# Patient Record
Sex: FEMALE | Race: WHITE | NOT HISPANIC OR LATINO | Employment: OTHER | URBAN - METROPOLITAN AREA
[De-identification: names, ages, dates, MRNs, and addresses within clinical notes are randomized per-mention and may not be internally consistent; named-entity substitution may affect disease eponyms.]

---

## 2017-02-16 DIAGNOSIS — F41.1 ANXIETY STATE: ICD-10-CM

## 2017-02-16 DIAGNOSIS — E78.5 HYPERLIPIDEMIA LDL GOAL <130: Primary | ICD-10-CM

## 2017-02-16 DIAGNOSIS — I10 HYPERTENSION GOAL BP (BLOOD PRESSURE) < 140/90: ICD-10-CM

## 2017-02-16 RX ORDER — PRAVASTATIN SODIUM 80 MG/1
80 TABLET ORAL DAILY
Qty: 90 TABLET | Refills: 0 | Status: SHIPPED | OUTPATIENT
Start: 2017-02-16 | End: 2017-02-27

## 2017-02-16 RX ORDER — LISINOPRIL 20 MG/1
20 TABLET ORAL DAILY
Qty: 90 TABLET | Refills: 0 | Status: SHIPPED | OUTPATIENT
Start: 2017-02-16 | End: 2017-02-27

## 2017-02-16 NOTE — TELEPHONE ENCOUNTER
pravastatin    Last Written Prescription Date: 11/25/2016  Last Fill Quantity: 30, # refills: 11  Last Office Visit with [facility], [facility] or [facility] prescribing provider: 3/3/2016       Lab Results   Component Value Date    CHOL 312 03/02/2016     Lab Results   Component Value Date    HDL 37 03/02/2016     Lab Results   Component Value Date     03/02/2016     Lab Results   Component Value Date    TRIG 327 03/02/2016     Lab Results   Component Value Date    CHOLHDLRATIO 5.2 02/09/2015     lisinopril    Last Written Prescription Date: 11/25/2016  Last Fill Quantity: 90, # refills: 3  Last Office Visit with [facility] or [facility] prescribing provider: 3/3/2016       Potassium   Date Value Ref Range Status   03/02/2016 4.2 3.4 - 5.3 mmol/L Final     Creatinine   Date Value Ref Range Status   03/02/2016 0.87 0.52 - 1.04 mg/dL Final     BP Readings from Last 3 Encounters:   03/02/16 122/80   02/09/15 130/86   01/22/14 130/90     amitriptyline    Last Written Prescription Date: 11/25/2016  Last Fill Quantity: 90, # refills: 3    Last Office Visit with [facility] or [facility] prescribing provider:  [id number]   Future Office Visit:      Creatinine   Date Value Ref Range Status   03/02/2016 0.87 0.52 - 1.04 mg/dL Final

## 2017-02-27 ENCOUNTER — OFFICE VISIT (OUTPATIENT)
Dept: FAMILY MEDICINE | Facility: CLINIC | Age: 52
End: 2017-02-27
Payer: COMMERCIAL

## 2017-02-27 VITALS
SYSTOLIC BLOOD PRESSURE: 130 MMHG | HEART RATE: 80 BPM | TEMPERATURE: 97.7 F | BODY MASS INDEX: 29.66 KG/M2 | DIASTOLIC BLOOD PRESSURE: 86 MMHG | HEIGHT: 65 IN | RESPIRATION RATE: 18 BRPM | WEIGHT: 178 LBS

## 2017-02-27 DIAGNOSIS — I10 HYPERTENSION GOAL BP (BLOOD PRESSURE) < 140/90: ICD-10-CM

## 2017-02-27 DIAGNOSIS — Z23 NEED FOR PROPHYLACTIC VACCINATION AND INOCULATION AGAINST INFLUENZA: ICD-10-CM

## 2017-02-27 DIAGNOSIS — Z11.59 NEED FOR HEPATITIS C SCREENING TEST: ICD-10-CM

## 2017-02-27 DIAGNOSIS — Z12.11 SPECIAL SCREENING FOR MALIGNANT NEOPLASMS, COLON: ICD-10-CM

## 2017-02-27 DIAGNOSIS — F41.1 ANXIETY STATE: ICD-10-CM

## 2017-02-27 DIAGNOSIS — E78.5 HYPERLIPIDEMIA LDL GOAL <130: Primary | ICD-10-CM

## 2017-02-27 DIAGNOSIS — L91.8 SKIN TAG: ICD-10-CM

## 2017-02-27 LAB
ANION GAP SERPL CALCULATED.3IONS-SCNC: 7 MMOL/L (ref 3–14)
BUN SERPL-MCNC: 10 MG/DL (ref 7–30)
CALCIUM SERPL-MCNC: 8.9 MG/DL (ref 8.5–10.1)
CHLORIDE SERPL-SCNC: 105 MMOL/L (ref 94–109)
CHOLEST SERPL-MCNC: 242 MG/DL
CO2 SERPL-SCNC: 25 MMOL/L (ref 20–32)
CREAT SERPL-MCNC: 0.86 MG/DL (ref 0.52–1.04)
GFR SERPL CREATININE-BSD FRML MDRD: 69 ML/MIN/1.7M2
GLUCOSE SERPL-MCNC: 88 MG/DL (ref 70–99)
HCV AB SERPL QL IA: NORMAL
HDLC SERPL-MCNC: 39 MG/DL
LDLC SERPL CALC-MCNC: 144 MG/DL
NONHDLC SERPL-MCNC: 203 MG/DL
POTASSIUM SERPL-SCNC: 3.9 MMOL/L (ref 3.4–5.3)
SODIUM SERPL-SCNC: 137 MMOL/L (ref 133–144)
TRIGL SERPL-MCNC: 295 MG/DL

## 2017-02-27 PROCEDURE — 99214 OFFICE O/P EST MOD 30 MIN: CPT | Mod: 25 | Performed by: FAMILY MEDICINE

## 2017-02-27 PROCEDURE — 90686 IIV4 VACC NO PRSV 0.5 ML IM: CPT | Performed by: FAMILY MEDICINE

## 2017-02-27 PROCEDURE — 80048 BASIC METABOLIC PNL TOTAL CA: CPT | Performed by: FAMILY MEDICINE

## 2017-02-27 PROCEDURE — 36415 COLL VENOUS BLD VENIPUNCTURE: CPT | Performed by: FAMILY MEDICINE

## 2017-02-27 PROCEDURE — 11200 RMVL SKIN TAGS UP TO&INC 15: CPT | Performed by: FAMILY MEDICINE

## 2017-02-27 PROCEDURE — 80061 LIPID PANEL: CPT | Performed by: FAMILY MEDICINE

## 2017-02-27 PROCEDURE — 90471 IMMUNIZATION ADMIN: CPT | Performed by: FAMILY MEDICINE

## 2017-02-27 PROCEDURE — 86803 HEPATITIS C AB TEST: CPT | Performed by: FAMILY MEDICINE

## 2017-02-27 RX ORDER — PRAVASTATIN SODIUM 80 MG/1
40 TABLET ORAL DAILY
Qty: 90 TABLET | Refills: 3 | COMMUNITY
Start: 2017-02-27 | End: 2017-02-27

## 2017-02-27 RX ORDER — PRAVASTATIN SODIUM 80 MG/1
80 TABLET ORAL DAILY
Qty: 90 TABLET | Refills: 3 | Status: SHIPPED | OUTPATIENT
Start: 2017-02-27 | End: 2018-03-05

## 2017-02-27 RX ORDER — LISINOPRIL 20 MG/1
20 TABLET ORAL DAILY
Qty: 90 TABLET | Refills: 3 | Status: SHIPPED | OUTPATIENT
Start: 2017-02-27 | End: 2017-05-26

## 2017-02-27 NOTE — PATIENT INSTRUCTIONS
ASSESSMENT:   (E78.5) Hyperlipidemia LDL goal <130  (primary encounter diagnosis)  Comment: due for recheck  Plan: Lipid panel reflex to direct LDL, pravastatin         (PRAVACHOL) 80 MG tablet, DISCONTINUED:         pravastatin (PRAVACHOL) 80 MG tablet        You may need to increase the pravastatin to 80mg (a full pill) once daily.    (I10) Hypertension goal BP (blood pressure) < 140/90  Comment: doing well  Plan: lisinopril (PRINIVIL/ZESTRIL) 20 MG tablet,         Basic metabolic panel        No change in current treatment plan.   Monitor BP periodically.  This can be done at home, in clinic, at our pharmacy, at a store or by neighbor or relative with a blood pressure cuff.  You should be sitting and relaxed for several minutes when taking blood pressure.   Goal BP (most readings) should be less than 140/90    Normal blood pressure is 120/80.    If your blood pressure is consistently at or above the goal, some changes should be made with lifestyle or medication to keep blood pressure under good control.    High blood pressure over time can lead to eye and kidney damage and increase risk for heart attacks and strokes.   Let us know if readings are often high, so we can help get your blood pressure under good control.     (F41.1) Anxiety state  Comment: doing well  Plan: amitriptyline (ELAVIL) 25 MG tablet        REfills for a year.   Continue the amitriptyline for anxiety.  If continuing to do well and feeling symptoms are well controlled, you may continue the medication and recheck in a year. If you have concerns about possible side effects or problems with the medication or if it seems like it is not working well, recheck at any time. If you would like at some time to get off the medication, it is best to gradually reduce the dose, contact the clinic for help in doing this.     (Z23) Need for prophylactic vaccination and inoculation against influenza  Comment:   Plan: FLU VAC, SPLIT VIRUS IM > 3 YO  (QUADRIVALENT)         [87054], Vaccine Administration, Initial         [51518]          (L91.8) Skin tag  Comment: left neck  Plan: REMOVAL OF SKIN TAGS, FIRST 15         Skin tags # 1 are snipped off after using alcohol for prep and  sterile forceps and iris scissors. NO local anesthesia used.  [place]  Dressing applied.  These pathognomonic lesions are not sent for pathology.    (Z11.59) Need for hepatitis C screening test  Comment:   Plan: Hepatitis C antibody          (Z12.11) Special screening for malignant neoplasms, colon  Comment:   Plan: Fecal colorectal cancer screen (FIT)        Complete FIT colon cancer screening test and send in the mail.

## 2017-02-27 NOTE — MR AVS SNAPSHOT
After Visit Summary   2/27/2017    Gala Phan    MRN: [id number]           Patient Information     Date Of Birth          1965        Visit Information        Provider Department      2/27/2017 8:40 AM Adam Maher MD [facility]        Today's Diagnoses     Hyperlipidemia LDL goal <130    -  1    Hypertension goal BP (blood pressure) < 140/90        Anxiety state        Need for prophylactic vaccination and inoculation against influenza        Skin tag        Need for hepatitis C screening test        Special screening for malignant neoplasms, colon          Care Instructions    ASSESSMENT:   (E78.5) Hyperlipidemia LDL goal <130  (primary encounter diagnosis)  Comment: due for recheck  Plan: Lipid panel reflex to direct LDL, pravastatin         (PRAVACHOL) 80 MG tablet, DISCONTINUED:         pravastatin (PRAVACHOL) 80 MG tablet        You may need to increase the pravastatin to 80mg (a full pill) once daily.    (I10) Hypertension goal BP (blood pressure) < 140/90  Comment: doing well  Plan: lisinopril (PRINIVIL/ZESTRIL) 20 MG tablet,         Basic metabolic panel        No change in current treatment plan.   Monitor BP periodically.  This can be done at home, in clinic, at our pharmacy, at a store or by neighbor or relative with a blood pressure cuff.  You should be sitting and relaxed for several minutes when taking blood pressure.   Goal BP (most readings) should be less than 140/90    Normal blood pressure is 120/80.    If your blood pressure is consistently at or above the goal, some changes should be made with lifestyle or medication to keep blood pressure under good control.    High blood pressure over time can lead to eye and kidney damage and increase risk for heart attacks and strokes.   Let us know if readings are often high, so we can help get your blood pressure under good control.     (F41.1) Anxiety state  Comment: doing well  Plan: amitriptyline  (ELAVIL) 25 MG tablet        REfills for a year.   Continue the amitriptyline for anxiety.  If continuing to do well and feeling symptoms are well controlled, you may continue the medication and recheck in a year. If you have concerns about possible side effects or problems with the medication or if it seems like it is not working well, recheck at any time. If you would like at some time to get off the medication, it is best to gradually reduce the dose, contact the clinic for help in doing this.     (Z23) Need for prophylactic vaccination and inoculation against influenza  Comment:   Plan: FLU VAC, SPLIT VIRUS IM > 3 YO (QUADRIVALENT)         [97002], Vaccine Administration, Initial         [61797]          (L91.8) Skin tag  Comment: left neck  Plan: REMOVAL OF SKIN TAGS, FIRST 15         Skin tags # 1 are snipped off after using alcohol for prep and  sterile forceps and iris scissors. NO local anesthesia used.  [place]  Dressing applied.  These pathognomonic lesions are not sent for pathology.    (Z11.59) Need for hepatitis C screening test  Comment:   Plan: Hepatitis C antibody          (Z12.11) Special screening for malignant neoplasms, colon  Comment:   Plan: Fecal colorectal cancer screen (FIT)        Complete FIT colon cancer screening test and send in the mail.         Follow-ups after your visit        Future tests that were ordered for you today     Open Future Orders        Priority Expected Expires Ordered    Fecal colorectal cancer screen (FIT) Routine 3/20/2017 5/22/2017 2/27/2017            Who to contact     If you have questions or need follow up information about today's clinic visit or your schedule please contact [facility] directly at [phone].  Normal or non-critical lab and imaging results will be communicated to you by MyChart, letter or phone within 4 business days after the clinic has received the results. If you do not hear from us within 7  "days, please contact the clinic through [employer] or phone. If you have a critical or abnormal lab result, we will notify you by phone as soon as possible.  Submit refill requests through [employer] or call your pharmacy and they will forward the refill request to us. Please allow 3 business days for your refill to be completed.          Additional Information About Your Visit        [employer] Information     [employer] lets you send messages to your doctor, view your test results, renew your prescriptions, schedule appointments and more. To sign up, go to [email or web address] . Click on \"Log in\" on the left side of the screen, which will take you to the Welcome page. Then click on \"Sign up Now\" on the right side of the page.     You will be asked to enter the access code listed below, as well as some personal information. Please follow the directions to create your username and password.     Your access code is: [email or web address]  Expires: 2017  9:19 AM     Your access code will  in 90 days. If you need help or a new code, please call your [place] clinic or [phone].        Care EveryWhere ID     This is your Care EveryWhere ID. This could be used by other organizations to access your [facility] medical records  [id number]        Your Vitals Were     Pulse Temperature Respirations Height BMI (Body Mass Index)       80 97.7  F (36.5  C) (Tympanic) 18 5' 5.25\" (1.657 m) 29.39 kg/m2        Blood Pressure from Last 3 Encounters:   17 130/86   16 122/80   02/09/15 130/86    Weight from Last 3 Encounters:   17 178 lb (80.7 kg)   16 174 lb (78.9 kg)   02/09/15 172 lb (78 kg)              We Performed the Following     Basic metabolic panel     FLU VAC, SPLIT VIRUS IM > 3 YO (QUADRIVALENT) [87284]     Hepatitis C antibody     Lipid panel reflex to direct LDL     REMOVAL OF SKIN TAGS, FIRST 15     Vaccine Administration, Initial [47934]          Today's Medication Changes          These changes " are accurate as of: 2/27/17  9:19 AM.  If you have any questions, ask your nurse or doctor.               Start taking these medicines.        Dose/Directions    pravastatin 80 MG tablet   Commonly known as:  PRAVACHOL   Used for:  Hyperlipidemia LDL goal <130        Dose:  80 mg   Take 1 tablet (80 mg) by mouth daily   Quantity:  90 tablet   Refills:  3         These medicines have changed or have updated prescriptions.        Dose/Directions    amitriptyline 25 MG tablet   Commonly known as:  ELAVIL   This may have changed:  additional instructions   Used for:  Anxiety state        Dose:  25 mg   Take 1 tablet (25 mg) by mouth At Bedtime Take one tablet by mouth every day for preventing anxiety   Quantity:  90 tablet   Refills:  3       lisinopril 20 MG tablet   Commonly known as:  PRINIVIL/ZESTRIL   This may have changed:  additional instructions   Used for:  Hypertension goal BP (blood pressure) < 140/90        Dose:  20 mg   Take 1 tablet (20 mg) by mouth daily For blood pressure.   Quantity:  90 tablet   Refills:  3            Where to get your medicines      These medications were sent to [facility] [address] [place] [address] AT [facility] & [address] [place] [id number]     Phone:  [phone]     amitriptyline 25 MG tablet    lisinopril 20 MG tablet    pravastatin 80 MG tablet                Primary Care Provider Office Phone # Fax #    Adam Maher [phone]       [facility] [address] [facility] [address]        Thank you!     Thank you for choosing [facility]  for your care. Our goal is always to provide you with excellent care. Hearing back from our patients is one way we can continue to improve our services. Please take a few minutes to complete the written survey that you may receive in the mail after your visit with us. Thank you!             Your Updated Medication List - Protect  others around you: Learn how to safely use, store and throw away your medicines at www.disposemymeds.org.          This list is accurate as of: 2/27/17  9:19 AM.  Always use your most recent med list.                   Brand Name Dispense Instructions for use    amitriptyline 25 MG tablet    ELAVIL    90 tablet    Take 1 tablet (25 mg) by mouth At Bedtime Take one tablet by mouth every day for preventing anxiety       fish oil-omega-3 fatty acids 1000 MG capsule      one cap every other day       lisinopril 20 MG tablet    PRINIVIL/ZESTRIL    90 tablet    Take 1 tablet (20 mg) by mouth daily For blood pressure.       pravastatin 80 MG tablet    PRAVACHOL    90 tablet    Take 1 tablet (80 mg) by mouth daily       ranitidine 150 MG tablet    ZANTAC    60 tablet    Take 1 tablet (150 mg) by mouth 2 times daily

## 2017-02-27 NOTE — PROGRESS NOTES
SUBJECTIVE:                                                    Gala Phan is a 51 year old female who presents to clinic today for the following health issues:  Chief Complaint   Patient presents with     Hyperlipidemia     Hypertension     Flu Shot      Last clinic visit: 3/2/2016.  Check left side of neck growth has noted for years.  She would like to have it removed.   She has been feeling well.     Hyperlipidemia Follow-Up      Rate your low fat/cholesterol diet?: fair    Taking statin?  Yes, no muscle aches from statin    Other lipid medications/supplements?:  none   She is taking only 40mg pravastatin.  Never tried 80mg dose.   She had leg cramps on atorvastatin.     Hypertension Follow-up      Outpatient blood pressures are not being checked.    Low Salt Diet: low salt     Problem 4:.   Anxiety doing well.  The amitriptyline helps.      Amount of exercise or physical activity: not at this time, but walks dog and walking at work.  Walks dog a mile.     Problems taking medications regularly: No    Medication side effects: none    Patient Active Problem List   Diagnosis     Anxiety state     Esophageal reflux     Breast screening     HYPERLIPIDEMIA LDL GOAL <130     Hypertension goal BP (blood pressure) < 140/90     Tobacco abuse      History   Smoking Status     Current Every Day Smoker     Packs/day: 0.50     Years: 28.00   Smokeless Tobacco     Never Used     Comment: started at age 19.  2/12/2010:smoking 1/4-1/2 ppd.     Smokes 1/2 ppd.  Thinks about quitting.  Too much stress to quit.    Tried Chantix.  Did not help-gave her crazy feelings.   Could not do patches.  Allergic to tape.     ROS:General: No change in weight, sleep or appetite.  Normal energy.  No fever or chills  Resp: No coughing, wheezing or shortness of breath  CV: No chest pains or palpitations  GI: No nausea, vomiting,  heartburn, abdominal pain, diarrhea, constipation or change in bowel habits  : No urinary frequency or  "dysuria, bladder or kidney problems  Musculoskeletal: No significant muscle or joint pains  Psychiatric: POSITIVE for:, anxiety, doing well.     OBJECTIVE:Blood pressure 130/86, pulse 80, temperature 97.7  F (36.5  C), temperature source Tympanic, resp. rate 18, height 5' 5.25\" (1.657 m), weight 178 lb (80.7 kg), not currently breastfeeding. BMI=Body mass index is 29.39 kg/(m^2).  GENERAL APPEARANCE ADULT: Alert, no acute distress  NECK: No adenopathy,masses or thyromegaly  RESP: lungs clear to auscultation   CV: normal rate, regular rhythm, no murmur or gallop  ABDOMEN: soft, no organomegaly, masses or tenderness  MS: extremities normal, no peripheral edema  SKIN: one small skin tag 1mm at base left neck     ASSESSMENT:   (E78.5) Hyperlipidemia LDL goal <130  (primary encounter diagnosis)  Comment: due for recheck  Plan: Lipid panel reflex to direct LDL, pravastatin         (PRAVACHOL) 80 MG tablet, DISCONTINUED:         pravastatin (PRAVACHOL) 80 MG tablet        You may need to increase the pravastatin to 80mg (a full pill) once daily.    (I10) Hypertension goal BP (blood pressure) < 140/90  Comment: doing well  Plan: lisinopril (PRINIVIL/ZESTRIL) 20 MG tablet,         Basic metabolic panel        No change in current treatment plan.   Monitor BP periodically.  This can be done at home, in clinic, at our pharmacy, at a store or by neighbor or relative with a blood pressure cuff.  You should be sitting and relaxed for several minutes when taking blood pressure.   Goal BP (most readings) should be less than 140/90    Normal blood pressure is 120/80.    If your blood pressure is consistently at or above the goal, some changes should be made with lifestyle or medication to keep blood pressure under good control.    High blood pressure over time can lead to eye and kidney damage and increase risk for heart attacks and strokes.   Let us know if readings are often high, so we can help get your blood pressure under good " control.     (F41.1) Anxiety state  Comment: doing well  Plan: amitriptyline (ELAVIL) 25 MG tablet        REfills for a year.   Continue the amitriptyline for anxiety.  If continuing to do well and feeling symptoms are well controlled, you may continue the medication and recheck in a year. If you have concerns about possible side effects or problems with the medication or if it seems like it is not working well, recheck at any time. If you would like at some time to get off the medication, it is best to gradually reduce the dose, contact the clinic for help in doing this.     (Z23) Need for prophylactic vaccination and inoculation against influenza  Comment:   Plan: FLU VAC, SPLIT VIRUS IM > 3 YO (QUADRIVALENT)         [53036], Vaccine Administration, Initial         [47024]          (L91.8) Skin tag  Comment: left neck  Plan: REMOVAL OF SKIN TAGS, FIRST 15         Skin tags # 1 are snipped off after using alcohol for prep and  sterile forceps and iris scissors. NO local anesthesia used.  [place]  Dressing applied.  These pathognomonic lesions are not sent for pathology.    (Z11.59) Need for hepatitis C screening test  Comment:   Plan: Hepatitis C antibody          (Z12.11) Special screening for malignant neoplasms, colon  Comment:   Plan: Fecal colorectal cancer screen (FIT)        Complete FIT colon cancer screening test and send in the mail.        ============================================================  Injectable Influenza Immunization Documentation    1.  Is the person to be vaccinated sick today?  No    2. Does the person to be vaccinated have an allergy to eggs or to a component of the vaccine?  No    3. Has the person to be vaccinated today ever had a serious reaction to influenza vaccine in the past?  No    4. Has the person to be vaccinated ever had Guillain-[place] syndrome?  No     Form completed by

## 2017-02-28 NOTE — PROGRESS NOTES
Please call. Hepatitis C test is negative.    The blood chemistries (Basic metabolic panel) are all normal including electrolytes (salt balances in the blood), blood glucose and kidney tests.  Lipids are abnormal.   PLAN:  She has been on pravastatin.  I would suggest changing to a more effective cholesterol lowering medication like atorvastatin at 40mg once daily.  We can send prescription if she would like #30 with 11 refills.     JOHNATHAN DRUMMOND MD

## 2017-03-02 NOTE — PROGRESS NOTES
Please call.  She could increase the pravastatin to 80mg daily if she would like to try that first.    We could send prescription if she would prefer, #30 with 3 refills.   Check fasting lipids in 2-3 months on the higher dose.

## 2017-03-07 NOTE — PROGRESS NOTES
Called patient, she has already increased the pravastatin.  Patient will recheck lipids in 2-3 months  Rox MENCHACA MA

## 2017-12-29 ENCOUNTER — TELEPHONE (OUTPATIENT)
Dept: FAMILY MEDICINE | Facility: CLINIC | Age: 52
End: 2017-12-29

## 2018-02-28 DIAGNOSIS — F41.1 ANXIETY STATE: ICD-10-CM

## 2018-02-28 NOTE — TELEPHONE ENCOUNTER
"Requested Prescriptions   Pending Prescriptions Disp Refills     amitriptyline (ELAVIL) 25 MG tablet 90 tablet 3     Sig: Take 1 tablet (25 mg) by mouth At Bedtime Take one tablet by mouth every day for preventing anxiety    Tricyclic Antidepressants Protocol Failed    2/28/2018  9:56 AM       Failed - Recent (12 mo) or future (30 d) visit with authorizing provider's specialty     Patient had office visit in the last year or has a visit in the next 30 days with authorizing provider.  See \"Patient Info\" tab in inbasket, or \"Choose Columns\" in Meds & Orders section of the refill encounter.            Failed - No positive pregnancy test in past 12 months       Passed - Blood pressure under 140/90 in past 12 months    BP Readings from Last 3 Encounters:   02/27/17 130/86   03/02/16 122/80   02/09/15 130/86                Passed - Patient is age 18 or older       Passed - No active pregnancy on record        Last Written Prescription Date:  02/27/17  Last Fill Quantity: 90,  # refills: 3   Last office visit: 2/27/2017 with prescribing provider:  02/27/17   Future Office Visit:      "

## 2018-03-05 ENCOUNTER — OFFICE VISIT (OUTPATIENT)
Dept: FAMILY MEDICINE | Facility: CLINIC | Age: 53
End: 2018-03-05
Payer: COMMERCIAL

## 2018-03-05 ENCOUNTER — TELEPHONE (OUTPATIENT)
Dept: FAMILY MEDICINE | Facility: CLINIC | Age: 53
End: 2018-03-05

## 2018-03-05 VITALS
HEART RATE: 88 BPM | BODY MASS INDEX: 29.82 KG/M2 | TEMPERATURE: 98.1 F | DIASTOLIC BLOOD PRESSURE: 88 MMHG | RESPIRATION RATE: 16 BRPM | HEIGHT: 65 IN | WEIGHT: 179 LBS | SYSTOLIC BLOOD PRESSURE: 130 MMHG

## 2018-03-05 DIAGNOSIS — Z23 NEED FOR PROPHYLACTIC VACCINATION AND INOCULATION AGAINST INFLUENZA: ICD-10-CM

## 2018-03-05 DIAGNOSIS — E78.5 HYPERLIPIDEMIA LDL GOAL <130: ICD-10-CM

## 2018-03-05 DIAGNOSIS — K21.9 GASTROESOPHAGEAL REFLUX DISEASE WITHOUT ESOPHAGITIS: ICD-10-CM

## 2018-03-05 DIAGNOSIS — I10 HYPERTENSION GOAL BP (BLOOD PRESSURE) < 140/90: Primary | ICD-10-CM

## 2018-03-05 DIAGNOSIS — Z72.0 TOBACCO ABUSE: ICD-10-CM

## 2018-03-05 DIAGNOSIS — L91.8 SKIN TAG: ICD-10-CM

## 2018-03-05 DIAGNOSIS — Z12.11 COLON CANCER SCREENING: ICD-10-CM

## 2018-03-05 DIAGNOSIS — F41.1 ANXIETY STATE: ICD-10-CM

## 2018-03-05 LAB
ANION GAP SERPL CALCULATED.3IONS-SCNC: 6 MMOL/L (ref 3–14)
BUN SERPL-MCNC: 11 MG/DL (ref 7–30)
CALCIUM SERPL-MCNC: 8.8 MG/DL (ref 8.5–10.1)
CHLORIDE SERPL-SCNC: 106 MMOL/L (ref 94–109)
CHOLEST SERPL-MCNC: 204 MG/DL
CO2 SERPL-SCNC: 26 MMOL/L (ref 20–32)
CREAT SERPL-MCNC: 0.86 MG/DL (ref 0.52–1.04)
GFR SERPL CREATININE-BSD FRML MDRD: 69 ML/MIN/1.7M2
GLUCOSE SERPL-MCNC: 79 MG/DL (ref 70–99)
HDLC SERPL-MCNC: 38 MG/DL
LDLC SERPL CALC-MCNC: 116 MG/DL
NONHDLC SERPL-MCNC: 166 MG/DL
POTASSIUM SERPL-SCNC: 4 MMOL/L (ref 3.4–5.3)
SODIUM SERPL-SCNC: 138 MMOL/L (ref 133–144)
TRIGL SERPL-MCNC: 249 MG/DL

## 2018-03-05 PROCEDURE — 11200 RMVL SKIN TAGS UP TO&INC 15: CPT | Performed by: FAMILY MEDICINE

## 2018-03-05 PROCEDURE — 90471 IMMUNIZATION ADMIN: CPT | Performed by: FAMILY MEDICINE

## 2018-03-05 PROCEDURE — 99214 OFFICE O/P EST MOD 30 MIN: CPT | Mod: 25 | Performed by: FAMILY MEDICINE

## 2018-03-05 PROCEDURE — 90686 IIV4 VACC NO PRSV 0.5 ML IM: CPT | Performed by: FAMILY MEDICINE

## 2018-03-05 PROCEDURE — 36415 COLL VENOUS BLD VENIPUNCTURE: CPT | Performed by: FAMILY MEDICINE

## 2018-03-05 PROCEDURE — 80061 LIPID PANEL: CPT | Performed by: FAMILY MEDICINE

## 2018-03-05 PROCEDURE — 80048 BASIC METABOLIC PNL TOTAL CA: CPT | Performed by: FAMILY MEDICINE

## 2018-03-05 RX ORDER — LISINOPRIL 20 MG/1
20 TABLET ORAL DAILY
Qty: 90 TABLET | Refills: 3 | Status: SHIPPED | OUTPATIENT
Start: 2018-03-05 | End: 2019-03-04

## 2018-03-05 RX ORDER — PRAVASTATIN SODIUM 80 MG/1
80 TABLET ORAL DAILY
Qty: 90 TABLET | Refills: 3 | Status: SHIPPED | OUTPATIENT
Start: 2018-03-05 | End: 2019-03-26

## 2018-03-05 ASSESSMENT — ANXIETY QUESTIONNAIRES
3. WORRYING TOO MUCH ABOUT DIFFERENT THINGS: NOT AT ALL
1. FEELING NERVOUS, ANXIOUS, OR ON EDGE: NOT AT ALL
2. NOT BEING ABLE TO STOP OR CONTROL WORRYING: NOT AT ALL
IF YOU CHECKED OFF ANY PROBLEMS ON THIS QUESTIONNAIRE, HOW DIFFICULT HAVE THESE PROBLEMS MADE IT FOR YOU TO DO YOUR WORK, TAKE CARE OF THINGS AT HOME, OR GET ALONG WITH OTHER PEOPLE: NOT DIFFICULT AT ALL
7. FEELING AFRAID AS IF SOMETHING AWFUL MIGHT HAPPEN: NOT AT ALL
6. BECOMING EASILY ANNOYED OR IRRITABLE: NOT AT ALL
1. FEELING NERVOUS, ANXIOUS, OR ON EDGE: NOT AT ALL
2. NOT BEING ABLE TO STOP OR CONTROL WORRYING: NOT AT ALL
GAD7 TOTAL SCORE: 0
6. BECOMING EASILY ANNOYED OR IRRITABLE: NOT AT ALL
5. BEING SO RESTLESS THAT IT IS HARD TO SIT STILL: NOT AT ALL
5. BEING SO RESTLESS THAT IT IS HARD TO SIT STILL: NOT AT ALL
GAD7 TOTAL SCORE: 0
3. WORRYING TOO MUCH ABOUT DIFFERENT THINGS: NOT AT ALL
7. FEELING AFRAID AS IF SOMETHING AWFUL MIGHT HAPPEN: NOT AT ALL

## 2018-03-05 ASSESSMENT — PATIENT HEALTH QUESTIONNAIRE - PHQ9
5. POOR APPETITE OR OVEREATING: NOT AT ALL
5. POOR APPETITE OR OVEREATING: NOT AT ALL

## 2018-03-05 ASSESSMENT — PAIN SCALES - GENERAL: PAINLEVEL: NO PAIN (0)

## 2018-03-05 NOTE — NURSING NOTE
"Chief Complaint   Patient presents with     Hypertension     Lipids     Flu Shot       Initial /88 (BP Location: Right arm, Patient Position: Chair, Cuff Size: Adult Large)  Pulse 88  Temp 98.1  F (36.7  C) (Tympanic)  Resp 16  Ht 5' 5.25\" (1.657 m)  Wt 179 lb (81.2 kg)  LMP 11/04/2009  Breastfeeding? No  BMI 29.56 kg/m2 Estimated body mass index is 29.56 kg/(m^2) as calculated from the following:    Height as of this encounter: 5' 5.25\" (1.657 m).    Weight as of this encounter: 179 lb (81.2 kg).      Health Maintenance that is potentially due pending provider review:  Pap Smear and Colonoscopy/FIT    Pt will schedule pap appt.    Is there anyone who you would like to be able to receive your results? No  If yes have patient fill out KATINA  Selene Lucio CMA    "

## 2018-03-05 NOTE — MR AVS SNAPSHOT
After Visit Summary   3/5/2018    Gala Phan    MRN: [id number]           Patient Information     Date Of Birth          1965        Visit Information        Provider Department      3/5/2018 9:40 AM Adam Maher MD [facility]        Today's Diagnoses     Hypertension goal BP (blood pressure) < 140/90    -  1    Hyperlipidemia LDL goal <130        Anxiety state        Gastroesophageal reflux disease without esophagitis        Colon cancer screening        Tobacco abuse        Skin tag          Care Instructions    ASSESSMENT:   (I10) Hypertension goal BP (blood pressure) < 140/90  (primary encounter diagnosis)  Comment: doing OK  Plan: lisinopril (PRINIVIL/ZESTRIL) 20 MG tablet,         Basic metabolic panel        No change in current treatment plan. REfills.   Monitor BP periodically.  This can be done at home, in clinic, at our pharmacy, at a store or by neighbor or relative with a blood pressure cuff.  You should be sitting and relaxed for several minutes when taking blood pressure.   Goal BP (most readings) should be less than 140/90    Normal blood pressure is 120/80.    If your blood pressure is consistently at or above the goal, some changes should be made with lifestyle or medication to keep blood pressure under good control.    High blood pressure over time can lead to eye and kidney damage and increase risk for heart attacks and strokes.   Let us know if readings are often high, so we can help get your blood pressure under good control.      (E78.5) Hyperlipidemia LDL goal <130  Comment: due for recheck  Plan: pravastatin (PRAVACHOL) 80 MG tablet, Lipid         panel reflex to direct LDL Fasting        Fasting blood tests today.  Refills.     (F41.1) Anxiety state  Comment: doing well  Plan: amitriptyline (ELAVIL) 25 MG tablet        Refills.     (K21.9) Gastroesophageal reflux disease without esophagitis  Comment: infrequent ranitidine use.   Plan:  ranitidine (ZANTAC) 150 MG tablet        REfills    (Z12.11) Colon cancer screening  Comment:   Plan: Fecal colorectal cancer screen (FIT)        Complete FIT colon cancer screening test and send in the mail.      (Z72.0) Tobacco abuse  Comment: thinking about quitting  Plan: varenicline (CHANTIX STARTING MONTH PAK) 0.5 MG        X 11 & 1 MG X 42 tablet        Reviewed potential side effects of Chantix including nausea and GI symptoms.  Also potential for abnormal dreams, suicidal thoughts  and psychiatric symptoms.   Instructions in use given with gradually increasing dose over the first week, 0.5 mg daily for 3 days, then twice daily for 4 days, then increase to 1 mg pills and take twice daily.  Start medication 1 week before stopping smoking.  Expect to use for 12 weeks and if successful in quitting, to continue use for another 12 weeks.  If not off cigarettes in 12 weeks, stop the medication and consider other options for quitting.                Follow-ups after your visit        Future tests that were ordered for you today     Open Future Orders        Priority Expected Expires Ordered    Fecal colorectal cancer screen (FIT) Routine 3/26/2018 5/28/2018 3/5/2018            Who to contact     If you have questions or need follow up information about today's clinic visit or your schedule please contact [facility] directly at [phone].  Normal or non-critical lab and imaging results will be communicated to you by MyChart, letter or phone within 4 business days after the clinic has received the results. If you do not hear from us within 7 days, please contact the clinic through MyChart or phone. If you have a critical or abnormal lab result, we will notify you by phone as soon as possible.  Submit refill requests through [facility] or call your pharmacy and they will forward the refill request to us. Please allow 3 business days for your refill to be completed.          Additional Information  "About Your Visit        Care EveryWhere ID     This is your Care EveryWhere ID. This could be used by other organizations to access your [facility] medical records  [id number]        Your Vitals Were     Pulse Temperature Respirations Height Last Period Breastfeeding?    88 98.1  F (36.7  C) (Tympanic) 16 5' 5.25\" (1.657 m) 11/04/2009 No    BMI (Body Mass Index)                   29.56 kg/m2            Blood Pressure from Last 3 Encounters:   03/05/18 130/88   02/27/17 130/86   03/02/16 122/80    Weight from Last 3 Encounters:   03/05/18 179 lb (81.2 kg)   02/27/17 178 lb (80.7 kg)   03/02/16 174 lb (78.9 kg)              We Performed the Following     Basic metabolic panel     Lipid panel reflex to direct LDL Fasting          Today's Medication Changes          These changes are accurate as of 3/5/18 10:29 AM.  If you have any questions, ask your nurse or doctor.               Start taking these medicines.        Dose/Directions    varenicline 0.5 MG X 11 & 1 MG X 42 tablet   Commonly known as:  CHANTIX STARTING MONTH HUMBLE   Used for:  Tobacco abuse   Started by:  Adam Maher MD        Take 0.5 mg daily for 3 days, then twice daily for 4 days, then increase to 1 mg pills and take twice daily.  Start medication 1 week before stopping smoking.   Quantity:  53 tablet   Refills:  5            Where to get your medicines      These medications were sent to [facility] [address] [place] [address] AT [facility] [address] [address]     Phone:  [phone]     amitriptyline 25 MG tablet    lisinopril 20 MG tablet    pravastatin 80 MG tablet    ranitidine 150 MG tablet         Some of these will need a paper prescription and others can be bought over the counter.  Ask your nurse if you have questions.     Bring a paper prescription for each of these medications     varenicline 0.5 MG X 11 & 1 MG X 42 tablet                Primary Care Provider Office " Phone # Fax #    Adam Maher [phone] [address] [place] [id number]        Equal Access to Services     LUCITA CARDONA : Giovanni aad ku hadjoselinhaleigh Sindhujonh, warobinda adonayqsena, qaybta kaalmada zenaida, ellie pierson tellykayode fregoso larachelelizabeth alvarado. So [facility] [phone].    ATENCIÓN: Si habla español, tiene a loo disposición servicios gratuitos de asistencia lingüística. Llame al [phone].    We comply with applicable federal civil rights laws and [place] laws. We do not discriminate on the basis of race, color, national origin, age, disability, sex, sexual orientation, or gender identity.            Thank you!     Thank you for choosing [facility]  for your care. Our goal is always to provide you with excellent care. Hearing back from our patients is one way we can continue to improve our services. Please take a few minutes to complete the written survey that you may receive in the mail after your visit with us. Thank you!             Your Updated Medication List - Protect others around you: Learn how to safely use, store and throw away your medicines at www.disposemymeds.org.          This list is accurate as of 3/5/18 10:29 AM.  Always use your most recent med list.                   Brand Name Dispense Instructions for use Diagnosis    amitriptyline 25 MG tablet    ELAVIL    90 tablet    Take 1 tablet (25 mg) by mouth At Bedtime Take one tablet by mouth every day for preventing anxiety.    Anxiety state       fish oil-omega-3 fatty acids 1000 MG capsule      one cap every other day        lisinopril 20 MG tablet    PRINIVIL/ZESTRIL    90 tablet    Take 1 tablet (20 mg) by mouth daily For blood pressure.    Hypertension goal BP (blood pressure) < 140/90       pravastatin 80 MG tablet    PRAVACHOL    90 tablet    Take 1 tablet (80 mg) by mouth daily    Hyperlipidemia LDL goal <130       ranitidine 150 MG tablet    ZANTAC    30 tablet    Take 1 tablet (150 mg) by  mouth 2 times daily    Gastroesophageal reflux disease without esophagitis       varenicline 0.5 MG X 11 & 1 MG X 42 tablet    CHANTIX STARTING MONTH HUMBLE    53 tablet    Take 0.5 mg daily for 3 days, then twice daily for 4 days, then increase to 1 mg pills and take twice daily.  Start medication 1 week before stopping smoking.    Tobacco abuse

## 2018-03-05 NOTE — PROGRESS NOTES

## 2018-03-05 NOTE — PROGRESS NOTES
SUBJECTIVE:   Gala Phan is a 52 year old female who presents to clinic today for the following health issues:  Chief Complaint   Patient presents with     Hypertension     Lipids     Flu Shot      Last clinic visit: 2/27/2017.  Needs refills.     Hyperlipidemia Follow-Up      Rate your low fat/cholesterol diet?: not monitoring fat    Taking statin?  Yes, no muscle aches from statin    Other lipid medications/supplements?:  Fish oil/Omega 3, dose  without side effects  She has been on pravastatin at night.     Hypertension Follow-up      Outpatient blood pressures are not being checked.    Low Salt Diet: no added salt    Anxiety Follow-Up    Status since last visit: stable and controlled    Other associated symptoms:None    Complicating factors:   Significant life event: No   Current substance abuse: None  Depression symptoms: No  ALISHA-7 SCORE 12/1/2011 1/22/2014 3/5/2018   Total Score 1 1 -   Total Score - - 0     She feels her anxiety has been doing well.   PHQ9 score today= 4,     ALISHA-7    Amount of exercise or physical activity: None    Problems taking medications regularly: No    Medication side effects: none    Diet: regular (no restrictions)    Problem 4:   One skin tag inferior to left axilla she wanted removed today.  Catches on bra.     Patient Active Problem List   Diagnosis     Anxiety state     Esophageal reflux     Breast screening     HYPERLIPIDEMIA LDL GOAL <130     Hypertension goal BP (blood pressure) < 140/90     Tobacco abuse      History   Smoking Status     Current Every Day Smoker     Packs/day: 0.50     Years: 28.00     Types: Cigarettes   Smokeless Tobacco     Never Used     Comment: started at age 19.  2/12/2010:smoking 1/4-1/2 ppd.     Smoking 1/2-1 ppd.  Tried Chantix in the past.  Caused weird dreams.  Did not help her quit.  Allergic to tape, feels she cannot do patch.   No alcohol.     ROS:General: No change in weight, sleep or appetite.  Normal energy.  No fever or chills.   "Difficulty losing weight.    Exercise:walks dogs.   Resp: No coughing, wheezing or shortness of breath  CV: No chest pains or palpitations  GI: POSITIVE for:, heartburn or reflux, takes ranitidine infrequently.   : No urinary frequency or dysuria, bladder or kidney problems  Musculoskeletal: No significant muscle or joint pains  Psychiatric: No problems with anxiety, depression or mental health    OBJECTIVE:Blood pressure 130/88, pulse 88, temperature 98.1  F (36.7  C), temperature source Tympanic, resp. rate 16, height 5' 5.25\" (1.657 m), weight 179 lb (81.2 kg), last menstrual period 11/04/2009, not currently breastfeeding. BMI=Body mass index is 29.56 kg/(m^2).  GENERAL APPEARANCE ADULT: Alert, no acute distress, overweight  NECK: No adenopathy,masses or thyromegaly  RESP: lungs clear to auscultation   CV: normal rate, regular rhythm, no murmur or gallop  ABDOMEN: soft, no organomegaly, masses or tenderness  MS: extremities normal, no peripheral edema   SKIN: One flesh colored skin tag inferior to left axilla pedunculated, 4mm with 2mm base.    She has another skin tag on lower left eyelid just at or below margin.     ASSESSMENT:   (I10) Hypertension goal BP (blood pressure) < 140/90  (primary encounter diagnosis)  Comment: doing OK  Plan: lisinopril (PRINIVIL/ZESTRIL) 20 MG tablet,         Basic metabolic panel        No change in current treatment plan. REfills.   Monitor BP periodically.  This can be done at home, in clinic, at our pharmacy, at a store or by neighbor or relative with a blood pressure cuff.  You should be sitting and relaxed for several minutes when taking blood pressure.   Goal BP (most readings) should be less than 140/90    Normal blood pressure is 120/80.    If your blood pressure is consistently at or above the goal, some changes should be made with lifestyle or medication to keep blood pressure under good control.    High blood pressure over time can lead to eye and kidney damage and " increase risk for heart attacks and strokes.   Let us know if readings are often high, so we can help get your blood pressure under good control.      (E78.5) Hyperlipidemia LDL goal <130  Comment: due for recheck  Plan: pravastatin (PRAVACHOL) 80 MG tablet, Lipid         panel reflex to direct LDL Fasting        Fasting blood tests today.  Refills.     (F41.1) Anxiety state  Comment: doing well  Plan: amitriptyline (ELAVIL) 25 MG tablet        Refills.     (K21.9) Gastroesophageal reflux disease without esophagitis  Comment: infrequent ranitidine use.   Plan: ranitidine (ZANTAC) 150 MG tablet        REfills    (Z12.11) Colon cancer screening  Comment:   Plan: Fecal colorectal cancer screen (FIT)        Complete FIT colon cancer screening test and send in the mail.      (Z72.0) Tobacco abuse  Comment: thinking about quitting  Plan: varenicline (CHANTIX STARTING MONTH PAK) 0.5 MG        X 11 & 1 MG X 42 tablet        Reviewed potential side effects of Chantix including nausea and GI symptoms.  Also potential for abnormal dreams, suicidal thoughts  and psychiatric symptoms.   Instructions in use given with gradually increasing dose over the first week, 0.5 mg daily for 3 days, then twice daily for 4 days, then increase to 1 mg pills and take twice daily.  Start medication 1 week before stopping smoking.  Expect to use for 12 weeks and if successful in quitting, to continue use for another 12 weeks.  If not off cigarettes in 12 weeks, stop the medication and consider other options for quitting.     Skin tag   Skin tags # 1 are snipped off after using Betadine for prep and  sterile forceps and iris scissors. Local anesthesia with xylocaine with epinephrine was used. Electrocautery used on the base.  Dressing applied.  These pathognomonic lesions are not sent for pathology.    She will see Ophthalmology for removal of eyelid lesion. .

## 2018-03-05 NOTE — PATIENT INSTRUCTIONS
ASSESSMENT:   (I10) Hypertension goal BP (blood pressure) < 140/90  (primary encounter diagnosis)  Comment: doing OK  Plan: lisinopril (PRINIVIL/ZESTRIL) 20 MG tablet,         Basic metabolic panel        No change in current treatment plan. REfills.   Monitor BP periodically.  This can be done at home, in clinic, at our pharmacy, at a store or by neighbor or relative with a blood pressure cuff.  You should be sitting and relaxed for several minutes when taking blood pressure.   Goal BP (most readings) should be less than 140/90    Normal blood pressure is 120/80.    If your blood pressure is consistently at or above the goal, some changes should be made with lifestyle or medication to keep blood pressure under good control.    High blood pressure over time can lead to eye and kidney damage and increase risk for heart attacks and strokes.   Let us know if readings are often high, so we can help get your blood pressure under good control.      (E78.5) Hyperlipidemia LDL goal <130  Comment: due for recheck  Plan: pravastatin (PRAVACHOL) 80 MG tablet, Lipid         panel reflex to direct LDL Fasting        Fasting blood tests today.  Refills.     (F41.1) Anxiety state  Comment: doing well  Plan: amitriptyline (ELAVIL) 25 MG tablet        Refills.     (K21.9) Gastroesophageal reflux disease without esophagitis  Comment: infrequent ranitidine use.   Plan: ranitidine (ZANTAC) 150 MG tablet        REfills    (Z12.11) Colon cancer screening  Comment:   Plan: Fecal colorectal cancer screen (FIT)        Complete FIT colon cancer screening test and send in the mail.      (Z72.0) Tobacco abuse  Comment: thinking about quitting  Plan: varenicline (CHANTIX STARTING MONTH PAK) 0.5 MG        X 11 & 1 MG X 42 tablet        Reviewed potential side effects of Chantix including nausea and GI symptoms.  Also potential for abnormal dreams, suicidal thoughts  and psychiatric symptoms.   Instructions in use given with gradually increasing  dose over the first week, 0.5 mg daily for 3 days, then twice daily for 4 days, then increase to 1 mg pills and take twice daily.  Start medication 1 week before stopping smoking.  Expect to use for 12 weeks and if successful in quitting, to continue use for another 12 weeks.  If not off cigarettes in 12 weeks, stop the medication and consider other options for quitting.

## 2018-03-06 ASSESSMENT — ANXIETY QUESTIONNAIRES: GAD7 TOTAL SCORE: 0

## 2018-03-06 ASSESSMENT — PATIENT HEALTH QUESTIONNAIRE - PHQ9: SUM OF ALL RESPONSES TO PHQ QUESTIONS 1-9: 4

## 2018-03-06 NOTE — PROGRESS NOTES
Please call. The blood chemistries (Basic metabolic panel) are all normal including electrolytes (salt balances in the blood), blood glucose and kidney tests.   Lipids are abnormal but much better than when not on medication in the past.   PLAN: Weight loss, regular exercise with goal of 30 minutes most days of the week and eating a prudent diet (lots of fruits and vegetables, limiting unhealthy fats and excessive calories).  Continue the pravastatin at 80mg daily as she has been.

## 2018-04-18 PROCEDURE — 82274 ASSAY TEST FOR BLOOD FECAL: CPT | Performed by: FAMILY MEDICINE

## 2018-04-21 DIAGNOSIS — Z12.11 COLON CANCER SCREENING: ICD-10-CM

## 2018-04-21 LAB — HEMOCCULT STL QL IA: NEGATIVE

## 2018-04-21 NOTE — LETTER
April 24, 2018      Galavane Po[place]  [address] [facility] [address]        Dear ,    We are writing to inform you of your test results.    Your recent FIT test (colon cancer screening) was negative. Please repeat this screening yearly.    Resulted Orders   Fecal colorectal cancer screen (FIT)   Result Value Ref Range    Occult Blood Scn FIT Negative NEG^Negative       If you have any questions or concerns, please call the clinic at the number listed above.       Sincerely,    Adam Maher MD/ ss

## 2019-02-18 ENCOUNTER — TELEPHONE (OUTPATIENT)
Dept: FAMILY MEDICINE | Facility: CLINIC | Age: 54
End: 2019-02-18

## 2019-02-18 NOTE — TELEPHONE ENCOUNTER
Panel Management Review      Patient has the following on her problem list: None      Composite cancer screening  Chart review shows that this patient is due/due soon for the following Pap Smear, Mammogram, Colonoscopy and Fecal Colorectal (FIT)  Summary:    Patient is due/failing the following:   COLONOSCOPY, MAMMOGRAM and PAP    Action needed:   Patient needs office visit for Physical and cancer screening tests.    Type of outreach:    Sent letter.    Questions for provider review:    None                                                                                                                                    Mary Hartman MA

## 2019-03-04 DIAGNOSIS — I10 HYPERTENSION GOAL BP (BLOOD PRESSURE) < 140/90: ICD-10-CM

## 2019-03-04 RX ORDER — LISINOPRIL 20 MG/1
20 TABLET ORAL DAILY
Qty: 30 TABLET | Refills: 0 | Status: SHIPPED | OUTPATIENT
Start: 2019-03-04 | End: 2019-04-03

## 2019-03-04 NOTE — TELEPHONE ENCOUNTER
"Requested Prescriptions   Pending Prescriptions Disp Refills     lisinopril (PRINIVIL/ZESTRIL) 20 MG tablet 90 tablet 3     Sig: Take 1 tablet (20 mg) by mouth daily For blood pressure.    ACE Inhibitors (Including Combos) Protocol Passed - 3/4/2019 10:14 AM       Passed - Blood pressure under 140/90 in past 12 months    BP Readings from Last 3 Encounters:   03/05/18 130/88   02/27/17 130/86   03/02/16 122/80                Passed - Recent (12 mo) or future (30 days) visit within the authorizing provider's specialty    Patient had office visit in the last 12 months or has a visit in the next 30 days with authorizing provider or within the authorizing provider's specialty.  See \"Patient Info\" tab in inbasket, or \"Choose Columns\" in Meds & Orders section of the refill encounter.             Passed - Medication is active on med list       Passed - Patient is age 18 or older       Passed - No active pregnancy on record       Passed - Normal serum creatinine on file in past 12 months    Recent Labs   Lab Test 03/05/18  1034   CR 0.86            Passed - Normal serum potassium on file in past 12 months    Recent Labs   Lab Test 03/05/18  1034   POTASSIUM 4.0            Passed - No positive pregnancy test within past 12 months        Last Written Prescription Date:  03/15/18  Last Fill Quantity: 90,  # refills: 3   Last office visit: 3/5/2018 with prescribing provider:  03/05/18   Future Office Visit:      "

## 2019-03-25 DIAGNOSIS — F41.1 ANXIETY STATE: ICD-10-CM

## 2019-03-26 DIAGNOSIS — E78.5 HYPERLIPIDEMIA LDL GOAL <130: ICD-10-CM

## 2019-03-26 RX ORDER — PRAVASTATIN SODIUM 80 MG/1
80 TABLET ORAL DAILY
Qty: 30 TABLET | Refills: 0 | Status: SHIPPED | OUTPATIENT
Start: 2019-03-26 | End: 2019-04-03

## 2019-04-03 ENCOUNTER — OFFICE VISIT (OUTPATIENT)
Dept: FAMILY MEDICINE | Facility: CLINIC | Age: 54
End: 2019-04-03
Payer: COMMERCIAL

## 2019-04-03 VITALS
HEART RATE: 80 BPM | RESPIRATION RATE: 16 BRPM | WEIGHT: 181 LBS | TEMPERATURE: 97.3 F | DIASTOLIC BLOOD PRESSURE: 80 MMHG | SYSTOLIC BLOOD PRESSURE: 126 MMHG | BODY MASS INDEX: 30.16 KG/M2 | HEIGHT: 65 IN

## 2019-04-03 DIAGNOSIS — E78.5 HYPERLIPIDEMIA LDL GOAL <130: ICD-10-CM

## 2019-04-03 DIAGNOSIS — K21.9 GASTROESOPHAGEAL REFLUX DISEASE WITHOUT ESOPHAGITIS: ICD-10-CM

## 2019-04-03 DIAGNOSIS — F41.1 ANXIETY STATE: ICD-10-CM

## 2019-04-03 DIAGNOSIS — H92.03 OTALGIA OF BOTH EARS: ICD-10-CM

## 2019-04-03 DIAGNOSIS — Z12.11 COLON CANCER SCREENING: ICD-10-CM

## 2019-04-03 DIAGNOSIS — Z72.0 TOBACCO ABUSE: ICD-10-CM

## 2019-04-03 DIAGNOSIS — I10 HYPERTENSION GOAL BP (BLOOD PRESSURE) < 140/90: Primary | ICD-10-CM

## 2019-04-03 LAB
ANION GAP SERPL CALCULATED.3IONS-SCNC: 7 MMOL/L (ref 3–14)
BUN SERPL-MCNC: 13 MG/DL (ref 7–30)
CALCIUM SERPL-MCNC: 9.1 MG/DL (ref 8.5–10.1)
CHLORIDE SERPL-SCNC: 108 MMOL/L (ref 94–109)
CHOLEST SERPL-MCNC: 206 MG/DL
CO2 SERPL-SCNC: 25 MMOL/L (ref 20–32)
CREAT SERPL-MCNC: 0.88 MG/DL (ref 0.52–1.04)
GFR SERPL CREATININE-BSD FRML MDRD: 74 ML/MIN/{1.73_M2}
GLUCOSE SERPL-MCNC: 93 MG/DL (ref 70–99)
HDLC SERPL-MCNC: 38 MG/DL
LDLC SERPL CALC-MCNC: 117 MG/DL
NONHDLC SERPL-MCNC: 168 MG/DL
POTASSIUM SERPL-SCNC: 4.1 MMOL/L (ref 3.4–5.3)
SODIUM SERPL-SCNC: 140 MMOL/L (ref 133–144)
TRIGL SERPL-MCNC: 255 MG/DL

## 2019-04-03 PROCEDURE — 80061 LIPID PANEL: CPT | Performed by: FAMILY MEDICINE

## 2019-04-03 PROCEDURE — 80048 BASIC METABOLIC PNL TOTAL CA: CPT | Performed by: FAMILY MEDICINE

## 2019-04-03 PROCEDURE — 36415 COLL VENOUS BLD VENIPUNCTURE: CPT | Performed by: FAMILY MEDICINE

## 2019-04-03 PROCEDURE — 99214 OFFICE O/P EST MOD 30 MIN: CPT | Performed by: FAMILY MEDICINE

## 2019-04-03 RX ORDER — NEOMYCIN SULFATE, POLYMYXIN B SULFATE AND HYDROCORTISONE 10; 3.5; 1 MG/ML; MG/ML; [USP'U]/ML
3 SUSPENSION/ DROPS AURICULAR (OTIC) 4 TIMES DAILY
Qty: 1 BOTTLE | Refills: 3 | Status: SHIPPED | OUTPATIENT
Start: 2019-04-03 | End: 2021-06-04

## 2019-04-03 RX ORDER — PRAVASTATIN SODIUM 80 MG/1
80 TABLET ORAL DAILY
Qty: 90 TABLET | Refills: 3 | Status: SHIPPED | OUTPATIENT
Start: 2019-04-03 | End: 2020-03-25

## 2019-04-03 RX ORDER — LISINOPRIL 20 MG/1
20 TABLET ORAL DAILY
Qty: 90 TABLET | Refills: 3 | Status: SHIPPED | OUTPATIENT
Start: 2019-04-03 | End: 2020-03-25

## 2019-04-03 ASSESSMENT — MIFFLIN-ST. JEOR: SCORE: 1425.85

## 2019-04-03 NOTE — LETTER
"April 4, 2019      Gala Phan  [address] [facility] [address]        Dear ,    We are writing to inform you of your test results.    The blood chemistries (Basic metabolic panel) are all normal including electrolytes (salt balances in the blood), blood glucose and kidney tests.   Triglycerides, a type of fat found in the bloodstream is high.  LDL also high but she is on a good dose of pravastatin.  HDL (\"good cholesterol\") is low.  .  PLAN: No new changes in treatment recommended.   Weight loss, regular exercise with goal of 30 minutes most days of the week and eating a prudent diet (lots of fruits and vegetables, limiting unhealthy fats and excessive calories) can help improve cholesterol.    Resulted Orders   Lipid panel reflex to direct LDL Fasting   Result Value Ref Range    Cholesterol 206 (H) <200 mg/dL      Comment:      Desirable:       <200 mg/dl    Triglycerides 255 (H) <150 mg/dL      Comment:      Borderline high:  150-199 mg/dl  High:             200-499 mg/dl  Very high:       >499 mg/dl  Fasting specimen      HDL Cholesterol 38 (L) >49 mg/dL    LDL Cholesterol Calculated 117 (H) <100 mg/dL      Comment:      Above desirable:  100-129 mg/dl  Borderline High:  130-159 mg/dL  High:             160-189 mg/dL  Very high:       >189 mg/dl      Non HDL Cholesterol 168 (H) <130 mg/dL      Comment:      Above Desirable:  130-159 mg/dl  Borderline high:  160-189 mg/dl  High:             190-219 mg/dl  Very high:       >219 mg/dl     Basic metabolic panel   Result Value Ref Range    Sodium 140 133 - 144 mmol/L    Potassium 4.1 3.4 - 5.3 mmol/L    Chloride 108 94 - 109 mmol/L    Carbon Dioxide 25 20 - 32 mmol/L    Anion Gap 7 3 - 14 mmol/L    Glucose 93 70 - 99 mg/dL      Comment:      Fasting specimen    Urea Nitrogen 13 7 - 30 mg/dL    Creatinine 0.88 0.52 - 1.04 mg/dL    GFR Estimate 74 >60 mL/min/[1.73_m2]      Comment:      Non  GFR Calc  Starting 12/18/2018, " serum creatinine based estimated GFR (eGFR) will be   calculated using the Chronic Kidney Disease Epidemiology Collaboration   (CKD-EPI) equation.      GFR Estimate If Black 86 >60 mL/min/[1.73_m2]      Comment:       GFR Calc  Starting 12/18/2018, serum creatinine based estimated GFR (eGFR) will be   calculated using the Chronic Kidney Disease Epidemiology Collaboration   (CKD-EPI) equation.      Calcium 9.1 8.5 - 10.1 mg/dL       If you have any questions or concerns, please call the clinic at the number listed above.       Sincerely,        Adam Maher MD/[place]

## 2019-04-03 NOTE — NURSING NOTE
"Chief Complaint   Patient presents with     Hyperlipidemia     Hypertension       Initial /80   Pulse 80   Temp 97.3  F (36.3  C) (Tympanic)   Resp 16   Ht 1.657 m (5' 5.25\")   Wt 82.1 kg (181 lb)   LMP 11/04/2009   BMI 29.89 kg/m   Estimated body mass index is 29.89 kg/m  as calculated from the following:    Height as of this encounter: 1.657 m (5' 5.25\").    Weight as of this encounter: 82.1 kg (181 lb).    Patient presents to the clinic using No DME    Health Maintenance that is potentially due pending provider review:  Mammogram, Pap Smear and Colonoscopy/FIT    Discussed and FIT given to pt        Is there anyone who you would like to be able to receive your results?   If yes have patient fill out KATINA    "

## 2019-04-03 NOTE — PATIENT INSTRUCTIONS
ASSESSMENT:   (I10) Hypertension goal BP (blood pressure) < 140/90  (primary encounter diagnosis)  Comment: doing well  Plan: lisinopril (PRINIVIL/ZESTRIL) 20 MG tablet,         Basic metabolic panel        No change in current treatment plan.  Refills.   Monitor BP periodically.  This can be done at home, in clinic, at our pharmacy, at a store or by neighbor or relative with a blood pressure cuff.  You should be sitting and relaxed for several minutes when taking blood pressure.   Goal BP (most readings) should be less than 140/90    Normal blood pressure is 120/80.    If your blood pressure is consistently at or above the goal, some changes should be made with lifestyle or medication to keep blood pressure under good control.    High blood pressure over time can lead to eye and kidney damage and increase risk for heart attacks and strokes.   Let us know if readings are often high, so we can help get your blood pressure under good control.      (E78.5) Hyperlipidemia LDL goal <130  Comment: due for follow-up  Plan: Lipid panel reflex to direct LDL Fasting,         pravastatin (PRAVACHOL) 80 MG tablet        Fasting blood tests today.  Refills.     (K21.9) Gastroesophageal reflux disease without esophagitis  Comment:   Plan: ranitidine (ZANTAC) 150 MG tablet        REfills.  Uses as needed.     (F41.1) Anxiety state  Comment: stable  Plan: amitriptyline (ELAVIL) 25 MG tablet        Refills.     (Z12.11) Colon cancer screening  Comment:   Plan: Fecal colorectal cancer screen (FIT)        Complete FIT colon cancer screening test and send in the mail.      (Z72.0) Tobacco abuse  Comment: working on quitting.  Quitting smoking is the biggest factor in reducing chances of heart attacks.     (H92.03) Otalgia of both ears  Comment: I think you have sensitive skin in ears.   Plan: neomycin-polymyxin-hydrocortisone (CORTISPORIN)        3.5-71972-2 otic suspension          1% hydrocortisone cream twice daily as needed in  outer ear for itching or soreness.    If further in the ear canal, you can use the ear drops four times daily several drops at a time as needed.    Both these medications are anti-inflammatory.

## 2019-04-03 NOTE — PROGRESS NOTES
SUBJECTIVE:   Gala Phan is a 53 year old female who presents to clinic today for the following health issues:  Chief Complaint   Patient presents with     Hyperlipidemia     Hypertension      Last clinic visit: 3/5/2018.  Prescribed Chantix.     Left ear-some crusting and scaling.  Gets tender.    Needs refills.     Hyperlipidemia Follow-Up      Rate your low fat/cholesterol diet?: fair    Taking statin?  Yes, no muscle aches from statin    Other lipid medications/supplements?:  none    Hypertension Follow-up      Outpatient blood pressures are not being checked.    Low Salt Diet: low salt    Anxiety Follow-Up    Status since last visit: No change    Other associated symptoms:None    Complicating factors:   Significant life event: Mom and Dad has had some health issues   Current substance abuse: None  Depression symptoms: No  ALISHA-7 SCORE 1/22/2014 3/5/2018 3/5/2018   Total Score 1 - -   Total Score - 0 0   Mental health she feels is good.     ALISHA-7    Amount of exercise or physical activity: busy with her work    Problems taking medications regularly: No    Medication side effects: none      Bilateral ear issues      Duration: months to yeare    Description (location/character/radiation): notes swelling, sore and drainage    Intensity:  mild, moderate, severe    Accompanying signs and symptoms: see above    History (similar episodes/previous evaluation): Yes    Precipitating or alleviating factors: None    Therapies tried and outcome: peroxide on q-tip     History   Smoking Status     Current Every Day Smoker     Packs/day: 0.50     Years: 28.00     Types: Cigarettes   Smokeless Tobacco     Never Used     Comment: started at age 19.  2/12/2010:smoking 1/4-1/2 ppd.     Smoking 1/2 ppd.  Tried Chantix.  Caused side effects-nightmares.  Has tried nicotine gum.  Patches irritated skin.     Patient Active Problem List   Diagnosis     Anxiety state     Esophageal reflux     Breast screening     HYPERLIPIDEMIA  "LDL GOAL <130     Hypertension goal BP (blood pressure) < 140/90     Tobacco abuse      ROS:General: No change in weight, sleep or appetite.  Normal energy.  No fever or chills  Resp: No coughing, wheezing or shortness of breath  CV: No chest pains or palpitations  GI: POSITIVE for:, heartburn or reflux, takes ranitidine intermittently.   : No urinary frequency or dysuria, bladder or kidney problems  Musculoskeletal: No significant muscle or joint pains  Psychiatric: POSITIVE for:, anxiety, seems OK.  On amitriptyline nightly.     OBJECTIVE:Blood pressure 126/80, pulse 80, temperature 97.3  F (36.3  C), temperature source Tympanic, resp. rate 16, height 1.657 m (5' 5.25\"), weight 82.1 kg (181 lb), last menstrual period 11/04/2009, not currently breastfeeding. BMI=Body mass index is 29.89 kg/m .  GENERAL APPEARANCE ADULT: Alert, no acute distress, overweight  NECK: No adenopathy,masses or thyromegaly  RESP: lungs clear to auscultation   CV: normal rate, regular rhythm, no murmur or gallop  ABDOMEN: soft, no organomegaly, masses or tenderness  MS: extremities normal, no peripheral edema     ASSESSMENT:   (I10) Hypertension goal BP (blood pressure) < 140/90  (primary encounter diagnosis)  Comment: doing well  Plan: lisinopril (PRINIVIL/ZESTRIL) 20 MG tablet,         Basic metabolic panel        No change in current treatment plan.  Refills.   Monitor BP periodically.  This can be done at home, in clinic, at our pharmacy, at a store or by neighbor or relative with a blood pressure cuff.  You should be sitting and relaxed for several minutes when taking blood pressure.   Goal BP (most readings) should be less than 140/90    Normal blood pressure is 120/80.    If your blood pressure is consistently at or above the goal, some changes should be made with lifestyle or medication to keep blood pressure under good control.    High blood pressure over time can lead to eye and kidney damage and increase risk for heart attacks " and strokes.   Let us know if readings are often high, so we can help get your blood pressure under good control.      (E78.5) Hyperlipidemia LDL goal <130  Comment: due for follow-up  Plan: Lipid panel reflex to direct LDL Fasting,         pravastatin (PRAVACHOL) 80 MG tablet        Fasting blood tests today.  Refills.     (K21.9) Gastroesophageal reflux disease without esophagitis  Comment:   Plan: ranitidine (ZANTAC) 150 MG tablet        REfills.  Uses as needed.     (F41.1) Anxiety state  Comment: stable  Plan: amitriptyline (ELAVIL) 25 MG tablet        Refills.     (Z12.11) Colon cancer screening  Comment:   Plan: Fecal colorectal cancer screen (FIT)        Complete FIT colon cancer screening test and send in the mail.      (Z72.0) Tobacco abuse  Comment: working on quitting.  Quitting smoking is the biggest factor in reducing chances of heart attacks.     (H92.03) Otalgia of both ears  Comment: I think you have sensitive skin in ears.   Plan: neomycin-polymyxin-hydrocortisone (CORTISPORIN)        3.5-91034-2 otic suspension          1% hydrocortisone cream twice daily as needed in outer ear for itching or soreness.    If further in the ear canal, you can use the ear drops four times daily several drops at a time as needed.    Both these medications are anti-inflammatory.

## 2019-04-04 NOTE — RESULT ENCOUNTER NOTE
"Please call.  The blood chemistries (Basic metabolic panel) are all normal including electrolytes (salt balances in the blood), blood glucose and kidney tests.   Triglycerides, a type of fat found in the bloodstream is high.  LDL also high but she is on a good dose of pravastatin.  HDL (\"good cholesterol\") is low.  .  PLAN: No new changes in treatment recommended.   Weight loss, regular exercise with goal of 30 minutes most days of the week and eating a prudent diet (lots of fruits and vegetables, limiting unhealthy fats and excessive calories) can help improve cholesterol."

## 2019-04-09 ENCOUNTER — HOSPITAL ENCOUNTER (OUTPATIENT)
Dept: MAMMOGRAPHY | Facility: CLINIC | Age: 54
Discharge: HOME | End: 2019-04-09
Attending: FAMILY MEDICINE | Admitting: FAMILY MEDICINE
Payer: COMMERCIAL

## 2019-04-09 DIAGNOSIS — Z12.31 VISIT FOR SCREENING MAMMOGRAM: ICD-10-CM

## 2019-04-09 PROCEDURE — 77063 BREAST TOMOSYNTHESIS BI: CPT

## 2019-06-19 DIAGNOSIS — F41.1 ANXIETY STATE: ICD-10-CM

## 2019-06-19 NOTE — TELEPHONE ENCOUNTER
"Requested Prescriptions   Signed Prescriptions Disp Refills    amitriptyline (ELAVIL) 25 MG tablet 90 tablet 0     Sig: TAKE 1 TABLET BY MOUTH EVERY DAY AT BEDTIME FOR PREVENTING ANXIETY.       Tricyclic Agents ( Annual appt and no PHQ9) Passed - 6/19/2019  1:55 PM        Passed - Blood Pressure under 140/90 in past 12 mos     BP Readings from Last 3 Encounters:   04/03/19 126/80   03/05/18 130/88   02/27/17 130/86                 Passed - Recent (12 mo) or future (30 days) visit within authorizing provider's specialty     Patient had office visit in the last 12 months or has a visit in the next 30 days with authorizing provider or within the authorizing provider's specialty.  See \"Patient Info\" tab in inbasket, or \"Choose Columns\" in Meds & Orders section of the refill encounter.              Passed - Medication is active on med list        Passed - Patient is age 18 or older        Passed - Patient is not pregnant        Passed - No positive pregnancy test on record in past 12 mos          "

## 2019-07-10 ENCOUNTER — TELEPHONE (OUTPATIENT)
Dept: FAMILY MEDICINE | Facility: CLINIC | Age: 54
End: 2019-07-10

## 2019-07-10 NOTE — TELEPHONE ENCOUNTER
Panel Management Review      Patient has the following on her problem list:     Hypertension   Last three blood pressure readings:  BP Readings from Last 3 Encounters:   04/03/19 126/80   03/05/18 130/88   02/27/17 130/86     Blood pressure: Passed    HTN Guidelines:  Less than 140/90      Composite cancer screening  Chart review shows that this patient is due/due soon for the following Pap Smear and Fecal Colorectal (FIT)  Summary:    Patient is due/failing the following:   FIT and PAP    Action needed:   Patient needs office visit for pe and pap. Patient states she has the FIT test at home and will complete    Type of outreach:    Phone, spoke to patient.  .kf    Questions for provider review:    None                                                                                                                                    Selene Lucio CMA       Chart routed to  .

## 2020-03-25 ENCOUNTER — VIRTUAL VISIT (OUTPATIENT)
Dept: FAMILY MEDICINE | Facility: CLINIC | Age: 55
End: 2020-03-25
Payer: COMMERCIAL

## 2020-03-25 DIAGNOSIS — F41.1 ANXIETY STATE: ICD-10-CM

## 2020-03-25 DIAGNOSIS — I10 HYPERTENSION GOAL BP (BLOOD PRESSURE) < 140/90: Primary | ICD-10-CM

## 2020-03-25 DIAGNOSIS — Z72.0 TOBACCO ABUSE: ICD-10-CM

## 2020-03-25 DIAGNOSIS — E78.5 HYPERLIPIDEMIA LDL GOAL <130: ICD-10-CM

## 2020-03-25 DIAGNOSIS — K21.9 GASTROESOPHAGEAL REFLUX DISEASE WITHOUT ESOPHAGITIS: ICD-10-CM

## 2020-03-25 PROCEDURE — 99214 OFFICE O/P EST MOD 30 MIN: CPT | Mod: TEL | Performed by: FAMILY MEDICINE

## 2020-03-25 RX ORDER — LISINOPRIL 20 MG/1
20 TABLET ORAL DAILY
Qty: 90 TABLET | Refills: 3 | Status: SHIPPED | OUTPATIENT
Start: 2020-03-25 | End: 2021-03-26

## 2020-03-25 RX ORDER — PRAVASTATIN SODIUM 80 MG/1
80 TABLET ORAL DAILY
Qty: 90 TABLET | Refills: 3 | Status: SHIPPED | OUTPATIENT
Start: 2020-03-25 | End: 2021-03-26

## 2020-03-25 ASSESSMENT — ANXIETY QUESTIONNAIRES
7. FEELING AFRAID AS IF SOMETHING AWFUL MIGHT HAPPEN: NOT AT ALL
2. NOT BEING ABLE TO STOP OR CONTROL WORRYING: NOT AT ALL
GAD7 TOTAL SCORE: 0
3. WORRYING TOO MUCH ABOUT DIFFERENT THINGS: NOT AT ALL
5. BEING SO RESTLESS THAT IT IS HARD TO SIT STILL: NOT AT ALL
6. BECOMING EASILY ANNOYED OR IRRITABLE: NOT AT ALL
1. FEELING NERVOUS, ANXIOUS, OR ON EDGE: NOT AT ALL

## 2020-03-25 ASSESSMENT — PATIENT HEALTH QUESTIONNAIRE - PHQ9
SUM OF ALL RESPONSES TO PHQ QUESTIONS 1-9: 0
5. POOR APPETITE OR OVEREATING: NOT AT ALL

## 2020-03-25 NOTE — PROGRESS NOTES
"SUBJECTIVE: Gala Pahn is a 54 year old female  Chief Complaint   Patient presents with     Hypertension     Anxiety        Gala Phan is a 54 year old female who is being evaluated via a billable telephone visit.      The patient has been notified of following:     \"This telephone visit will be conducted via a call between you and your physician/provider. We have found that certain health care needs can be provided without the need for a physical exam.  This service lets us provide the care you need with a short phone conversation.  If a prescription is necessary we can send it directly to your pharmacy.  If lab work is needed we can place an order for that and you can then stop by our lab to have the test done at a later time.    If during the course of the call the physician/provider feels a telephone visit is not appropriate, you will not be charged for this service.\"     Gala Phan complains of    Chief Complaint   Patient presents with     Hypertension     Anxiety   Today I am doing a telephone visit due to COVID-19 virus outbreak and attempts to limit clinic visits   Faviola has a visit by telephone today primarily for refills on medication.  She reports feeling well.  Last clinic visit: 4/3/2019.   She has several chronic health issues.  #1 hypertension.  She reports not checking her blood pressure.  She takes lisinopril 20 mg daily.  No side effects.    #2 anxiety.  She has been taking amitriptyline 25 mg at bedtime.  She feels her anxiety has been doing well.   generalized anxiety disorder scale score today= 0.    She does not sleep well some difficulty both getting to sleep and waking up at night.  She has a work schedule Friday Saturday and Sunday from 4:30 AM to 4:30 PM.  Her usual bedtime is 10:00.  She tries to get to bed earlier on the nights that she is working.  She does not nap.    #3 hyperlipidemia.  She has been taking pravastatin 80 mg daily no side effects she had previous " side effects from statin drugs.    #4 GERD-she notes infrequent use of ranitidine maybe once every 3 months.      I have reviewed and updated the patient's Past Medical History, Social History, Family History and Medication List.    ALLERGIES  Atorvastatin; Penicillins; and Tape [adhesive tape]    ROS:   No changes in weight sleep appetite or energy.  She continues to smoke 1 pack/day.  She thinks about quitting often.  Tried Chantix in the past but had side effects nightmares.  She has not been on nicotine replacement or Wellbutrin.  We did discuss the option of taking those for helping quit smoking.  She uses occasional eardrops for external otitis.  She has been doing well recently.  She denies respiratory or cardiovascular symptoms.  No GI or  symptoms.  No musculoskeletal symptoms.    Assessment/Plan:  (I10) Hypertension goal BP (blood pressure) < 140/90  (primary encounter diagnosis)  Comment: Due for refills  Plan: lisinopril (ZESTRIL) 20 MG tablet, Basic         metabolic panel        Check blood pressure when possible.   REfills.     (F41.1) Anxiety state  Comment: doing well  Plan: amitriptyline (ELAVIL) 25 MG tablet        REfills.     (E78.5) Hyperlipidemia LDL goal <130  Comment: due for follow-up   Plan: pravastatin (PRAVACHOL) 80 MG tablet, Lipid         panel reflex to direct LDL Fasting        Refills.     (K21.9) Gastroesophageal reflux disease without esophagitis  Comment: doing well with minimal meds.   Plan: Taking ranitidine infrequently.     (Z72.0) Tobacco abuse  Comment: thinking about quitting.   Plan: I recommended quitting smoking.  Reviewed various medications available to help quit smoking including Chantix, Zyban or nicotine replacement products.  Offered assistance in stopping smoking when needed.     She has FIT at home.  Reminder to send in and check to see it has not .   Come in for labs  When able.   Shingles vaccine is recommended for those adults age 50 and older.  The  newer vaccine available since 2018 is very effective in preventing shingles (over 90%).  It is not currently covered by Medicare.  Check at pharmacy for this vaccine, they can check on your cost and give you the vaccine.   The vaccine may be less expensive at a pharmacy.         Phone call duration:  16 minutes    JOHNATHAN DRUMMOND MD

## 2020-03-25 NOTE — PATIENT INSTRUCTIONS
Assessment/Plan:  (I10) Hypertension goal BP (blood pressure) < 140/90  (primary encounter diagnosis)  Comment: Due for refills  Plan: lisinopril (ZESTRIL) 20 MG tablet, Basic         metabolic panel        Check blood pressure when possible.   REfills.     (F41.1) Anxiety state  Comment: doing well  Plan: amitriptyline (ELAVIL) 25 MG tablet        REfills.     (E78.5) Hyperlipidemia LDL goal <130  Comment: due for follow-up   Plan: pravastatin (PRAVACHOL) 80 MG tablet, Lipid         panel reflex to direct LDL Fasting        Refills.     (K21.9) Gastroesophageal reflux disease without esophagitis  Comment: doing well with minimal meds.   Plan: Taking ranitidine infrequently.     (Z72.0) Tobacco abuse  Comment: thinking about quitting.   Plan: I recommended quitting smoking.  Reviewed various medications available to help quit smoking including Chantix, Zyban or nicotine replacement products.  Offered assistance in stopping smoking when needed.     She has FIT at home.  Reminder to send in and check to see it has not .   Come in for labs  When able.   Shingles vaccine is recommended for those adults age 50 and older.  The newer vaccine available since 2018 is very effective in preventing shingles (over 90%).  It is not currently covered by Medicare.  Check at pharmacy for this vaccine, they can check on your cost and give you the vaccine.   The vaccine may be less expensive at a pharmacy.

## 2020-03-26 ASSESSMENT — ANXIETY QUESTIONNAIRES: GAD7 TOTAL SCORE: 0

## 2020-04-02 ENCOUNTER — OFFICE VISIT (OUTPATIENT)
Dept: FAMILY MEDICINE | Facility: CLINIC | Age: 55
End: 2020-04-02
Payer: COMMERCIAL

## 2020-04-02 VITALS
HEIGHT: 66 IN | SYSTOLIC BLOOD PRESSURE: 126 MMHG | WEIGHT: 184.6 LBS | DIASTOLIC BLOOD PRESSURE: 80 MMHG | RESPIRATION RATE: 18 BRPM | BODY MASS INDEX: 29.67 KG/M2 | HEART RATE: 97 BPM | OXYGEN SATURATION: 98 % | TEMPERATURE: 96.9 F

## 2020-04-02 DIAGNOSIS — H57.8A9 SENSATION OF FOREIGN BODY IN EYE: Primary | ICD-10-CM

## 2020-04-02 PROCEDURE — 99213 OFFICE O/P EST LOW 20 MIN: CPT | Performed by: PHYSICIAN ASSISTANT

## 2020-04-02 ASSESSMENT — MIFFLIN-ST. JEOR: SCORE: 1449.09

## 2020-04-02 NOTE — PROGRESS NOTES
Subjective     Gala Phan is a 54 year old female who presents to clinic today for the following health issues:    HPI   Eye(s) Problem      Duration: 2:00 this am today    Description:  Location: right  Pain: YES  Redness: YES  Discharge: YES- watery    Accompanying signs and symptoms: none    History (Trauma, foreign body exposure,): None    Precipitating or alleviating factors (contact use): None    Therapies tried and outcome: None    Patient Active Problem List   Diagnosis     Anxiety state     Esophageal reflux     Breast screening     HYPERLIPIDEMIA LDL GOAL <130     Hypertension goal BP (blood pressure) < 140/90     Tobacco abuse     Past Surgical History:   Procedure Laterality Date     SURGICAL HISTORY OF -       Breast biopsy - benign       Social History     Tobacco Use     Smoking status: Current Every Day Smoker     Packs/day: 0.50     Years: 28.00     Pack years: 14.00     Types: Cigarettes     Smokeless tobacco: Never Used     Tobacco comment: started at age 19.  2/12/2010:smoking 1/4-1/2 ppd.    Substance Use Topics     Alcohol use: No     Family History   Problem Relation Age of Onset     Lipids Mother      Eye Disorder Mother         degenerative disease     Hypertension Father      Cardiovascular Father         Blood clots, legs     Cardiovascular Maternal Grandmother      Lipids Sister      Lipids Sister      C.A.D. Brother         MI in mid 40s     Heart Disease Brother 40        MI     Breast Cancer Other         Pat cousin          Current Outpatient Medications   Medication Sig Dispense Refill     lisinopril (ZESTRIL) 20 MG tablet Take 1 tablet (20 mg) by mouth daily 90 tablet 3     neomycin-polymyxin-hydrocortisone (CORTISPORIN) 3.5-81291-9 otic suspension Place 3 drops into both ears 4 times daily 1 Bottle 3     pravastatin (PRAVACHOL) 80 MG tablet Take 1 tablet (80 mg) by mouth daily 90 tablet 3     amitriptyline (ELAVIL) 25 MG tablet TAKE 1 TABLET BY MOUTH EVERY DAY AT BEDTIME  "FOR PREVENTING ANXIETY. 90 tablet 3     ranitidine (ZANTAC) 150 MG tablet Take 1 tablet (150 mg) by mouth 2 times daily (Patient not taking: Reported on 4/2/2020) 180 tablet 3     Allergies   Allergen Reactions     Atorvastatin Cramps     Penicillins      Tape [Adhesive Tape]      Reviewed and updated as needed this visit by Provider  Tobacco  Allergies  Meds  Problems  Med Hx  Surg Hx  Fam Hx       Review of Systems   ROS COMP: Constitutional, HEENT systems are negative, except as otherwise noted.      Objective    /80   Pulse 97   Temp 96.9  F (36.1  C) (Tympanic)   Resp 18   Ht 1.676 m (5' 6\")   Wt 83.7 kg (184 lb 9.6 oz)   LMP 11/04/2009   SpO2 98%   BMI 29.80 kg/m    Body mass index is 29.8 kg/m .  Physical Exam   Constitutional: healthy, alert, and no distress  Head: Normocephalic. Atraumatic  Eyes: Conjunctival injection on the right. Foreign body sensation on the right.  Full inspection of the eye without evidence of foreign body.  The upper lid was everted as well without any evidence of foreign body.  PERRLA, EOMI.  Respiratory: No resp distress.  Musculoskeletal: extremities normal- no gross deformities noted, and normal muscle tone  Neurologic: Gait normal. CN 2-12 grossly intact  Psychiatric: mentation appears normal and affect normal/bright    Diagnostic Test Results:  none       Assessment & Plan   (H57.9) Sensation of foreign body in eye  (primary encounter diagnosis)  Comment: Extensive eye exam was unremarkable today. No vision changes. Fluorescein stain was without evidence of corneal abrasion or ulcer. Her conjunctival injection does not appear consistent with bacterial conjunctivitis today. No occupational exposures to be concerned for metallic foreign body. Considered glaucoma and other vision threatening conditions but she has no vision changes over the course of the day. Pt will follow up with [facility] in [place] tomorrow. Pt given phone number to schedule appt. If " unable to schedule appt, would recommend starting abx eye drops.     Plan: Follow-up with Ophtho. If unable to be seen tomorrow, will start abx eye drops.      Return in about 2 days (around 4/4/2020), or if symptoms worsen or fail to improve.    Shiv Haskins PA-C  [facility]

## 2020-07-26 ENCOUNTER — HOSPITAL ENCOUNTER (EMERGENCY)
Facility: CLINIC | Age: 55
Discharge: HOME | End: 2020-07-26
Attending: PHYSICIAN ASSISTANT | Admitting: PHYSICIAN ASSISTANT
Payer: COMMERCIAL

## 2020-07-26 ENCOUNTER — APPOINTMENT (OUTPATIENT)
Dept: GENERAL RADIOLOGY | Facility: CLINIC | Age: 55
End: 2020-07-26
Attending: PHYSICIAN ASSISTANT
Payer: COMMERCIAL

## 2020-07-26 VITALS
DIASTOLIC BLOOD PRESSURE: 90 MMHG | TEMPERATURE: 98.2 F | WEIGHT: 170 LBS | BODY MASS INDEX: 27.32 KG/M2 | HEIGHT: 66 IN | RESPIRATION RATE: 18 BRPM | OXYGEN SATURATION: 99 % | SYSTOLIC BLOOD PRESSURE: 159 MMHG

## 2020-07-26 DIAGNOSIS — S52.134A CLOSED NONDISPLACED FRACTURE OF NECK OF RIGHT RADIUS, INITIAL ENCOUNTER: ICD-10-CM

## 2020-07-26 PROCEDURE — 73060 X-RAY EXAM OF HUMERUS: CPT | Mod: RT

## 2020-07-26 PROCEDURE — 24650 CLTX RDL HEAD/NCK FX WO MNPJ: CPT | Mod: RT | Performed by: PHYSICIAN ASSISTANT

## 2020-07-26 PROCEDURE — 73070 X-RAY EXAM OF ELBOW: CPT | Mod: RT

## 2020-07-26 PROCEDURE — 99284 EMERGENCY DEPT VISIT MOD MDM: CPT | Mod: 25 | Performed by: PHYSICIAN ASSISTANT

## 2020-07-26 PROCEDURE — 73110 X-RAY EXAM OF WRIST: CPT | Mod: RT

## 2020-07-26 PROCEDURE — 99285 EMERGENCY DEPT VISIT HI MDM: CPT | Mod: 25 | Performed by: PHYSICIAN ASSISTANT

## 2020-07-26 PROCEDURE — 24650 CLTX RDL HEAD/NCK FX WO MNPJ: CPT | Mod: 54 | Performed by: PHYSICIAN ASSISTANT

## 2020-07-26 RX ORDER — HYDROCODONE BITARTRATE AND ACETAMINOPHEN 5; 325 MG/1; MG/1
1 TABLET ORAL EVERY 6 HOURS PRN
Qty: 8 TABLET | Refills: 0 | Status: SHIPPED | OUTPATIENT
Start: 2020-07-26 | End: 2020-07-29

## 2020-07-26 ASSESSMENT — MIFFLIN-ST. JEOR: SCORE: 1377.86

## 2020-07-26 ASSESSMENT — ENCOUNTER SYMPTOMS: CONSTITUTIONAL NEGATIVE: 1

## 2020-07-26 NOTE — ED PROVIDER NOTES
History     Chief Complaint   Patient presents with     Fall     pt fell on Thursday injuring right arm with pain     Arm Pain     HPI  Gala Phan is a 55 year old female who presents with complaints of right arm and elbow pain since she fell 3 days ago.  Patient states she was stepping on a chair in order to hang blinds when the chair slipped out from underneath her and she fell, landing on outstretched arms.  Patient denies any head trauma from the fall.  Patient reports persistent pain and swelling about her right elbow.  She also complains of inability to make a full fist with her right hand and feels more weak in this hand.  Patient is also unable to fully extend her right elbow.  Patient is right-hand dominant.  She also struck her left knee in the fall but denies any pain, swelling, or difficulties ambulating.      Allergies:  Allergies   Allergen Reactions     Atorvastatin Cramps     Penicillins      Tape [Adhesive Tape]        Problem List:    Patient Active Problem List    Diagnosis Date Noted     Tobacco abuse 02/09/2015     Priority: Medium     Hypertension goal BP (blood pressure) < 140/90 12/01/2011     Priority: Medium     HYPERLIPIDEMIA LDL GOAL <130 10/31/2010     Priority: Medium     Breast screening 03/18/2007     Priority: Medium     3/19/2007: Recommended close follow up breast exams-monthly self exams and mammogram in 6 months followed by surgery recheck. See path report.  Problem list name updated by automated process. Provider to review       Anxiety state 03/22/2006     Priority: Medium     12/1/2011:She has been taking amitriptyline for the anxiety with good results  Problem list name updated by automated process. Provider to review       Esophageal reflux 03/22/2006     Priority: Medium        Past Medical History:    No past medical history on file.    Past Surgical History:    Past Surgical History:   Procedure Laterality Date     SURGICAL HISTORY OF -       Breast biopsy -  "benign       Family History:    Family History   Problem Relation Age of Onset     Lipids Mother      Eye Disorder Mother         degenerative disease     Hypertension Father      Cardiovascular Father         Blood clots, legs     Cardiovascular Maternal Grandmother      Lipids Sister      Lipids Sister      C.A.D. Brother         MI in mid 40s     Heart Disease Brother 40        MI     Breast Cancer Other         Pat cousin        Social History:  Marital Status:   [2]  Social History     Tobacco Use     Smoking status: Current Every Day Smoker     Packs/day: 0.50     Years: 28.00     Pack years: 14.00     Types: Cigarettes     Smokeless tobacco: Never Used     Tobacco comment: started at age 19.  2/12/2010:smoking 1/4-1/2 ppd.    Substance Use Topics     Alcohol use: No     Drug use: No        Medications:    HYDROcodone-acetaminophen (NORCO) 5-325 MG tablet  amitriptyline (ELAVIL) 25 MG tablet  lisinopril (ZESTRIL) 20 MG tablet  neomycin-polymyxin-hydrocortisone (CORTISPORIN) 3.5-30949-0 otic suspension  pravastatin (PRAVACHOL) 80 MG tablet  ranitidine (ZANTAC) 150 MG tablet          Review of Systems   Constitutional: Negative.    Musculoskeletal:        Right elbow pain and swelling   Skin: Negative.    All other systems reviewed and are negative.      Physical Exam   BP: (!) 159/90  Heart Rate: 110  Temp: 98.2  F (36.8  C)  Resp: 18  Height: 167.6 cm (5' 6\")  Weight: 77.1 kg (170 lb)  SpO2: 99 %      Physical Exam  Constitutional:       General: She is not in acute distress.     Appearance: Normal appearance. She is not ill-appearing, toxic-appearing or diaphoretic.   HENT:      Head: Normocephalic and atraumatic.   Pulmonary:      Effort: Pulmonary effort is normal.   Musculoskeletal:      Right elbow: She exhibits decreased range of motion, swelling and effusion. She exhibits no deformity and no laceration. Tenderness found. Radial head tenderness noted. No medial epicondyle, no lateral epicondyle " and no olecranon process tenderness noted.      Right wrist: She exhibits normal range of motion, no tenderness and no swelling.      Left knee: She exhibits ecchymosis. She exhibits normal range of motion, no swelling and no effusion. No tenderness found.      Right upper arm: She exhibits tenderness, bony tenderness and swelling. She exhibits no deformity and no laceration.      Right forearm: She exhibits no tenderness, no bony tenderness and no swelling.      Right hand: She exhibits swelling. She exhibits normal range of motion, no tenderness, normal capillary refill, no deformity and no laceration. Normal sensation noted. Normal strength noted.      Comments: Swelling and tenderness about the right elbow.  Patient is unable to fully extend this elbow and has pain with supination.  There is associated bruising of the area.   Skin:     General: Skin is warm and dry.      Capillary Refill: Capillary refill takes less than 2 seconds.   Neurological:      Mental Status: She is alert.      Sensory: Sensation is intact.      Motor: Motor function is intact.         ED Course        Procedures    Results for orders placed or performed during the hospital encounter of 07/26/20 (from the past 24 hour(s))   Elbow XR, 2 views, right    Narrative    EXAM: XR HUMERUS RT G/E 2 VW, XR WRIST RT G/E 3 VW, XR ELBOW RT 2 VW  LOCATION: [facility]  DATE/TIME: 7/26/2020 2:57 PM    INDICATION: Fall on outstretched arm. Swelling and pain.  COMPARISON: None.      Impression    IMPRESSION:   1. Radial neck nondisplaced fracture with minimal impaction. No radiographically apparent articular surface involvement.  2. Normal-appearing humerus and wrist.   Humerus XR, G/E 2 views, right    Narrative    EXAM: XR HUMERUS RT G/E 2 VW, XR WRIST RT G/E 3 VW, XR ELBOW RT 2 VW  LOCATION: [facility]  DATE/TIME: 7/26/2020 2:57 PM    INDICATION: Fall on outstretched arm. Swelling and pain.  COMPARISON: None.      Impression     IMPRESSION:   1. Radial neck nondisplaced fracture with minimal impaction. No radiographically apparent articular surface involvement.  2. Normal-appearing humerus and wrist.   XR Wrist Right G/E 3 Views    Narrative    EXAM: XR HUMERUS RT G/E 2 VW, XR WRIST RT G/E 3 VW, XR ELBOW RT 2 VW  LOCATION: [facility]  DATE/TIME: 7/26/2020 2:57 PM    INDICATION: Fall on outstretched arm. Swelling and pain.  COMPARISON: None.      Impression    IMPRESSION:   1. Radial neck nondisplaced fracture with minimal impaction. No radiographically apparent articular surface involvement.  2. Normal-appearing humerus and wrist.       Medications - No data to display    Assessments & Plan (with Medical Decision Making)     Pt is a 55 year old female who presents with complaints of right arm and elbow pain since she fell 3 days ago.  Patient states she was stepping on a chair in order to hang blinds when the chair slipped out from underneath her and she fell, landing on outstretched arms.  Patient denies any head trauma from the fall.  Patient reports persistent pain and swelling about her right elbow.  She also complains of inability to make a full fist with her right hand and feels more weak in this hand.  Patient is also unable to fully extend her right elbow.  Patient is right-hand dominant.  She also struck her left knee in the fall but denies any pain, swelling, or difficulties ambulating.    Pt is afebrile on arrival.  Exam as above.  X-rays of the right elbow show a nondisplaced radial neck fracture with minimal impaction.  X-rays of the right humerus and wrist appear normal.  Discussed results with patient.  Patient was placed in a sling.  Encouraged symptomatic treatments at home.  Return precautions were reviewed.  Hand-outs were provided.    Patient was sent with [facility] for pain and was instructed to follow-up with orthopedics in 3 to 5 days for continued care and management (referral was placed).  She is to  return to the ED for persistent and/or worsening symptoms.  Patient expressed understanding of the diagnosis and plan and was discharged home in good condition.    I have reviewed the nursing notes.    I have reviewed the findings, diagnosis, plan and need for follow up with the patient.    Discharge Medication List as of 7/26/2020  4:00 PM      START taking these medications    Details   HYDROcodone-acetaminophen (NORCO) 5-325 MG tablet Take 1 tablet by mouth every 6 hours as needed for severe pain, Disp-8 tablet,R-0, Local Print             Final diagnoses:   Closed nondisplaced fracture of neck of right radius, initial encounter       7/26/2020   [facility] EMERGENCY DEPARTMENT      Disclaimer:  This note consists of symbols derived from keyboarding, dictation and/or voice recognition software.  As a result, there may be errors in the script that have gone undetected.  Please consider this when interpreting information found in this chart.     Izzy Dash PA-C  07/26/20 [id number]

## 2020-07-26 NOTE — ED AVS SNAPSHOT
[facility] Emergency Department  [address] [address]  Phone:  [phone]  Fax:  [phone]                                    Gala Phan   MRN: [id number]    Department:  [facility] Emergency Department   Date of Visit:  7/26/2020           After Visit Summary Signature Page    I have received my discharge instructions, and my questions have been answered. I have discussed any challenges I see with this plan with the nurse or doctor.    ..........................................................................................................................................  Patient/Patient Representative Signature      ..........................................................................................................................................  Patient Representative Print Name and Relationship to Patient    ..................................................               ................................................  Date                                   Time    ..........................................................................................................................................  Reviewed by Signature/Title    ...................................................              ..............................................  Date                                               Time          22EPIC Rev 08/18

## 2020-07-26 NOTE — ED NOTES
Right forearm pain after a fall a few days ago. Slight bruising noted but can move arm without difficulty

## 2020-07-29 ENCOUNTER — OFFICE VISIT (OUTPATIENT)
Dept: ORTHOPEDICS | Facility: CLINIC | Age: 55
End: 2020-07-29
Attending: PHYSICIAN ASSISTANT
Payer: COMMERCIAL

## 2020-07-29 VITALS — BODY MASS INDEX: 28.77 KG/M2 | HEIGHT: 66 IN | WEIGHT: 179 LBS

## 2020-07-29 DIAGNOSIS — S52.134A CLOSED NONDISPLACED FRACTURE OF NECK OF RIGHT RADIUS, INITIAL ENCOUNTER: Primary | ICD-10-CM

## 2020-07-29 PROCEDURE — 24650 CLTX RDL HEAD/NCK FX WO MNPJ: CPT | Mod: 55 | Performed by: ORTHOPAEDIC SURGERY

## 2020-07-29 PROCEDURE — 99203 OFFICE O/P NEW LOW 30 MIN: CPT | Mod: 57 | Performed by: ORTHOPAEDIC SURGERY

## 2020-07-29 ASSESSMENT — PAIN SCALES - GENERAL: PAINLEVEL: MODERATE PAIN (5)

## 2020-07-29 ASSESSMENT — MIFFLIN-ST. JEOR: SCORE: 1418.69

## 2020-07-29 NOTE — PROGRESS NOTES
Chief Complaint:   Chief Complaint   Patient presents with     Right Elbow - Pain     Right proximal radius (head/neck) fracture. DOI: 7/23/20. Fell off a chair while hanging a drape. Fell on both outstretched arms. She presents in a sling, takes ibuprofen and ices PRN. Right handed, denies prior right upper extremity injuries.         HPI: Gala Phan is a 55 year old female , right -hand dominant, who presents for evaluation and management of a right elbow injury. The injury occurred on 7/23/2020 while falling off a chair while hanging drapes. She had just finished the drapes and was putting up the lighting, started to fall and fell on all fours, mostly on the right upper extremity. Onset of pain from the palm to the shoulder. Was seen in the emergency room on 7/26/2020 with xrays of the right upper extremity showing a nondisplaced radial neck fracture. Placed into a sling. It has been 6 days since the initial injury. Overall doing better. Taking ibuprofen, ice for pain. Denies previous right upper extremity problems.    She's been on furlough x15 weeks and was scheduled to return to work last Monday, 7/27/2020. She works in a tool crib(shop).      She reports having mild - moderate pain/discomfort around the injury site. She denies numbness or tingling.   Pain severity: 5/10  Pain quality: dull, aching and shooting  Frequency of symptoms: frequently  Associated symptoms: radiating pain to the wrist  Aggravated by: activities  Relieved by: rest, not moving the elbow or using the arm.    Treatment up to this point:ice, NSAIDS and sling.  Prior history of related problems: no prior problems with this area in the past      Past medical history:  has no past medical history on file.   Patient Active Problem List   Diagnosis     Anxiety state     Esophageal reflux     Breast screening     HYPERLIPIDEMIA LDL GOAL <130     Hypertension goal BP (blood pressure) < 140/90     Tobacco abuse       Past surgical  history:  has a past surgical history that includes surgical history of - .     Medications:   Current Outpatient Medications:      amitriptyline (ELAVIL) 25 MG tablet, TAKE 1 TABLET BY MOUTH EVERY DAY AT BEDTIME FOR PREVENTING ANXIETY., Disp: 90 tablet, Rfl: 3     HYDROcodone-acetaminophen (NORCO) 5-325 MG tablet, Take 1 tablet by mouth every 6 hours as needed for severe pain, Disp: 8 tablet, Rfl: 0     lisinopril (ZESTRIL) 20 MG tablet, Take 1 tablet (20 mg) by mouth daily, Disp: 90 tablet, Rfl: 3     neomycin-polymyxin-hydrocortisone (CORTISPORIN) 3.5-34787-2 otic suspension, Place 3 drops into both ears 4 times daily, Disp: 1 Bottle, Rfl: 3     pravastatin (PRAVACHOL) 80 MG tablet, Take 1 tablet (80 mg) by mouth daily, Disp: 90 tablet, Rfl: 3     ranitidine (ZANTAC) 150 MG tablet, Take 1 tablet (150 mg) by mouth 2 times daily, Disp: 180 tablet, Rfl: 3     Allergies:     Allergies   Allergen Reactions     Atorvastatin Cramps     Penicillins      Tape [Adhesive Tape]         Family History: family history includes Breast Cancer in an other family member; C.A.D. in her brother; Cardiovascular in her father and maternal grandmother; Eye Disorder in her mother; Heart Disease (age of onset: 40) in her brother; Hypertension in her father; Lipids in her mother, sister, and sister.     Social History:  reports that she has been smoking cigarettes. She has a 14.00 pack-year smoking history. She has never used smokeless tobacco. She reports that she does not drink alcohol or use drugs.    Review of Systems:  ROS: 10 point ROS neg other than the symptoms noted above in the HPI and past medical history.    Physical Exam  GENERAL APPEARANCE: healthy, alert, no distress.   SKIN: no suspicious lesions or rashes  RESPIRATORY: No increased work of breathing.  NEURO: Normal strength and tone, mentation intact and speech normal  PSYCH:  mentation appears normal and affect normal. Not anxious.  Hands: no clubbing, nail pitting or  "nodes.    Ht 1.676 m (5' 6\")   Wt 81.2 kg (179 lb)   LMP 11/04/2009   BMI 28.89 kg/m       right  UPPER EXTREMITY:    Sensation intact to light touch in median, radial, ulnar and axillary nerve distributions  Palpable 2+ radial pulse, brisk capillary refill to all fingers, wwp  Intact epl fpl fdp edc wrist flexion/extension biceps triceps deltoid      The sling was removed  ELBOW:  Skin intact. No open wounds. No skin maceration.  Inspection: swelling, ecchymosis medial > lateral   Tender: lateral epicondyle, common extensor tendon and radial head/neck  Non-tender: medial epicondyle  Range of Motion: flexion: 130 degrees, extension: lacks 30 degrees, forearm rotation limited by discomfort.  Strength: grossly intact, pain limited.  Special tests: normal stability:   There is no gross deformity in the area.      X-rays:  3 views right wrist, 2 views right elbow, 2 views right humerus from 7/26/2020 were reviewed in clinic today. Slight impacted, nondisplaced fracture of the radial head/neck junction. Cystic lesion with sclerotic rim ulnar aspect of the lunate. Small calcific, well-corticated density off tip of acromion at the shoulder.    Assessment: 55 year old female , right -hand dominant, with right upper extremity injury, nondisplaced radial head/neck fracture.    Plan:.  * reviewed xrays with patient. There is a nondisplaced fracture of the radial neck at head/neck junction. These are common injuries and tend to be stable. These can be treated nonoperatively with a brief period of immobilization, then work on range of motion as comfort allows. The number one complication with this type of injury is elbow stiffness, notably extension, with prolonged immobilization or favoring elbow. That is the reason to get elbow moving as soon as comfort allows.  * d/c splint  * gentle elbow range of motion in all planes, flexion, extension, supination, pronation as comfort allows. Should increase daily.  * rest  * ice as " needed.  * pain control (tylenol, nsaids as needed). Patient declines need for prescription.  * activity modification  * no heavy lifting, pushing, pulling  * return to clinic 4 weeks, sooner if needed. 3 views elbow xrays.  * workability note provided today. No use of right upper extremity other than writing, keyboard. No lifting, pushing, pulling. Free range of left upper extremity.      Ant Rizo M.D., M.S.  Dept. of Orthopaedic Surgery  [facility]

## 2020-07-29 NOTE — LETTER
[facility]  [address]  [place] [id number] [phone]      WORKABILITY    [facility], . Ant Rizo M.D., Víctor Benitez PA-C  [facility] [place]        7/29/2020      RE: Gala Phan    [address] [address]        To whom it may concern:     Gala Phan is under my professional care for   1. Closed nondisplaced fracture of neck of right radius, initial encounter        Date of injury: 7/23/20.     Ms. Phan can return to work WITH RESTRICTIONS: non-weight bearing right upper extremity. No lifting, pulling, pushing, carrying, etc. Ok to type and mouse with right hand occasionally.     Next appointment: 4 weeks.      Víctor Benitez PA-C, CAQ (Ortho)  Supervising Physician: Ant Rizo M.D., M.S.  Dept. of Orthopaedic Surgery  [facility]

## 2020-07-29 NOTE — LETTER
7/29/2020         RE: Gala Phan  [address] [facility] [address]        Dear Colleague,    Thank you for referring your patient, Gala Phan, to the [facility]. Please see a copy of my visit note below.    Chief Complaint:   Chief Complaint   Patient presents with     Right Elbow - Pain     Right proximal radius (head/neck) fracture. DOI: 7/23/20. Fell off a chair while hanging a drape. Fell on both outstretched arms. She presents in a sling, takes ibuprofen and ices PRN. Right handed, denies prior right upper extremity injuries.         HPI: Gala Phan is a 55 year old female , right -hand dominant, who presents for evaluation and management of a right elbow injury. The injury occurred on 7/23/2020 while falling off a chair while hanging drapes. She had just finished the drapes and was putting up the lighting, started to fall and fell on all fours, mostly on the right upper extremity. Onset of pain from the palm to the shoulder. Was seen in the emergency room on 7/26/2020 with xrays of the right upper extremity showing a nondisplaced radial neck fracture. Placed into a sling. It has been 6 days since the initial injury. Overall doing better. Taking ibuprofen, ice for pain. Denies previous right upper extremity problems.    She's been on furlough x15 weeks and was scheduled to return to work last Monday, 7/27/2020. She works in a tool crib(shop).      She reports having mild - moderate pain/discomfort around the injury site. She denies numbness or tingling.   Pain severity: 5/10  Pain quality: dull, aching and shooting  Frequency of symptoms: frequently  Associated symptoms: radiating pain to the wrist  Aggravated by: activities  Relieved by: rest, not moving the elbow or using the arm.    Treatment up to this point:ice, NSAIDS and sling.  Prior history of related problems: no prior problems with this area in the past      Past medical history:   has no past medical history on file.   Patient Active Problem List   Diagnosis     Anxiety state     Esophageal reflux     Breast screening     HYPERLIPIDEMIA LDL GOAL <130     Hypertension goal BP (blood pressure) < 140/90     Tobacco abuse       Past surgical history:  has a past surgical history that includes surgical history of - .     Medications:   Current Outpatient Medications:      amitriptyline (ELAVIL) 25 MG tablet, TAKE 1 TABLET BY MOUTH EVERY DAY AT BEDTIME FOR PREVENTING ANXIETY., Disp: 90 tablet, Rfl: 3     HYDROcodone-acetaminophen (NORCO) 5-325 MG tablet, Take 1 tablet by mouth every 6 hours as needed for severe pain, Disp: 8 tablet, Rfl: 0     lisinopril (ZESTRIL) 20 MG tablet, Take 1 tablet (20 mg) by mouth daily, Disp: 90 tablet, Rfl: 3     neomycin-polymyxin-hydrocortisone (CORTISPORIN) 3.5-38260-9 otic suspension, Place 3 drops into both ears 4 times daily, Disp: 1 Bottle, Rfl: 3     pravastatin (PRAVACHOL) 80 MG tablet, Take 1 tablet (80 mg) by mouth daily, Disp: 90 tablet, Rfl: 3     ranitidine (ZANTAC) 150 MG tablet, Take 1 tablet (150 mg) by mouth 2 times daily, Disp: 180 tablet, Rfl: 3     Allergies:     Allergies   Allergen Reactions     Atorvastatin Cramps     Penicillins      Tape [Adhesive Tape]         Family History: family history includes Breast Cancer in an other family member; C.A.D. in her brother; Cardiovascular in her father and maternal grandmother; Eye Disorder in her mother; Heart Disease (age of onset: 40) in her brother; Hypertension in her father; Lipids in her mother, sister, and sister.     Social History:  reports that she has been smoking cigarettes. She has a 14.00 pack-year smoking history. She has never used smokeless tobacco. She reports that she does not drink alcohol or use drugs.    Review of Systems:  ROS: 10 point ROS neg other than the symptoms noted above in the HPI and past medical history.    Physical Exam  GENERAL APPEARANCE: healthy, alert, no  "distress.   SKIN: no suspicious lesions or rashes  RESPIRATORY: No increased work of breathing.  NEURO: Normal strength and tone, mentation intact and speech normal  PSYCH:  mentation appears normal and affect normal. Not anxious.  Hands: no clubbing, nail pitting or nodes.    Ht 1.676 m (5' 6\")   Wt 81.2 kg (179 lb)   LMP 11/04/2009   BMI 28.89 kg/m       right  UPPER EXTREMITY:    Sensation intact to light touch in median, radial, ulnar and axillary nerve distributions  Palpable 2+ radial pulse, brisk capillary refill to all fingers, wwp  Intact epl fpl fdp edc wrist flexion/extension biceps triceps deltoid      The sling was removed  ELBOW:  Skin intact. No open wounds. No skin maceration.  Inspection: swelling, ecchymosis medial > lateral   Tender: lateral epicondyle, common extensor tendon and radial head/neck  Non-tender: medial epicondyle  Range of Motion: flexion: 130 degrees, extension: lacks 30 degrees, forearm rotation limited by discomfort.  Strength: grossly intact, pain limited.  Special tests: normal stability:   There is no gross deformity in the area.      X-rays:  3 views right wrist, 2 views right elbow, 2 views right humerus from 7/26/2020 were reviewed in clinic today. Slight impacted, nondisplaced fracture of the radial head/neck junction. Cystic lesion with sclerotic rim ulnar aspect of the lunate. Small calcific, well-corticated density off tip of acromion at the shoulder.    Assessment: 55 year old female , right -hand dominant, with right upper extremity injury, nondisplaced radial head/neck fracture.    Plan:.  * reviewed xrays with patient. There is a nondisplaced fracture of the radial neck at head/neck junction. These are common injuries and tend to be stable. These can be treated nonoperatively with a brief period of immobilization, then work on range of motion as comfort allows. The number one complication with this type of injury is elbow stiffness, notably extension, with " prolonged immobilization or favoring elbow. That is the reason to get elbow moving as soon as comfort allows.  * d/c splint  * gentle elbow range of motion in all planes, flexion, extension, supination, pronation as comfort allows. Should increase daily.  * rest  * ice as needed.  * pain control (tylenol, nsaids as needed). Patient declines need for prescription.  * activity modification  * no heavy lifting, pushing, pulling  * return to clinic 4 weeks, sooner if needed. 3 views elbow xrays.  * workability note provided today. No use of right upper extremity other than writing, keyboard. No lifting, pushing, pulling. Free range of left upper extremity.      Ant Rizo M.D., M.S.  Dept. of Orthopaedic Surgery  [facility]      Again, thank you for allowing me to participate in the care of your patient.        Sincerely,        Ant Rizo MD

## 2020-08-31 ENCOUNTER — OFFICE VISIT (OUTPATIENT)
Dept: ORTHOPEDICS | Facility: CLINIC | Age: 55
End: 2020-08-31
Payer: COMMERCIAL

## 2020-08-31 ENCOUNTER — ANCILLARY PROCEDURE (OUTPATIENT)
Dept: GENERAL RADIOLOGY | Facility: CLINIC | Age: 55
End: 2020-08-31
Attending: ORTHOPAEDIC SURGERY
Payer: COMMERCIAL

## 2020-08-31 VITALS
HEIGHT: 66 IN | HEART RATE: 94 BPM | SYSTOLIC BLOOD PRESSURE: 141 MMHG | WEIGHT: 184.1 LBS | DIASTOLIC BLOOD PRESSURE: 90 MMHG | BODY MASS INDEX: 29.59 KG/M2

## 2020-08-31 DIAGNOSIS — S52.134A CLOSED NONDISPLACED FRACTURE OF NECK OF RIGHT RADIUS, INITIAL ENCOUNTER: ICD-10-CM

## 2020-08-31 DIAGNOSIS — S52.134A CLOSED NONDISPLACED FRACTURE OF NECK OF RIGHT RADIUS, INITIAL ENCOUNTER: Primary | ICD-10-CM

## 2020-08-31 PROCEDURE — 73080 X-RAY EXAM OF ELBOW: CPT | Mod: RT

## 2020-08-31 PROCEDURE — 99207 ZZC FRACTURE CARE IN GLOBAL PERIOD: CPT | Performed by: ORTHOPAEDIC SURGERY

## 2020-08-31 ASSESSMENT — PAIN SCALES - GENERAL: PAINLEVEL: NO PAIN (0)

## 2020-08-31 ASSESSMENT — MIFFLIN-ST. JEOR: SCORE: 1441.82

## 2020-08-31 NOTE — LETTER
"    8/31/2020         RE: Gala Phan  [address] [facility] [address]        Dear Colleague,    Thank you for referring your patient, Gala Phan, to the [facility]. Please see a copy of my visit note below.    Chief Complaint:   Chief Complaint   Patient presents with     Right Elbow - RECHECK     Radial head/neck fracture. DOI: 7/23/20, 6 wk s/p. Patient notes her elbow is doing ok. She does have a pop in her elbow. She notes it catches or something and she will have to twist it or move it around to get it to release. Denies any pain.      INJURY: nondisplaced right radial neck fracture  DATE of INJURY: 7/23/2020    HPI: Gala Phan is a 55 year old female , right -hand dominant, who presents for evaluation and management of a right elbow injury. Returns today for following of nondisplaced radial neck fracture. Overall pain improved, no pain. Has some \"catching\" in the elbow with rotation. Using the arm for light activities. She'd like to be able to use it a little at work now.    The injury occurred on 7/23/2020 while falling off a chair while hanging drapes. She had just finished the drapes and was putting up the lighting, started to fall and fell on all fours, mostly on the right upper extremity. Onset of pain from the palm to the shoulder. Was seen in the emergency room on 7/26/2020 with xrays of the right upper extremity showing a nondisplaced radial neck fracture. Placed into a sling. It has been almost 6 weeks since the initial injury. Denies previous right upper extremity problems.        She reports having no pain/discomfort around the injury site. She denies numbness or tingling.   Associated symptoms: radiating pain to the wrist, \"catching\" in the elbow.  Aggravated by: activities  Relieved by: rest, not moving the elbow or using the arm.    Treatment up to this point:ice, NSAIDS and sling.  Prior history of related problems: no prior " problems with this area in the past      Past medical history:  has no past medical history on file.   Patient Active Problem List   Diagnosis     Anxiety state     Esophageal reflux     Breast screening     HYPERLIPIDEMIA LDL GOAL <130     Hypertension goal BP (blood pressure) < 140/90     Tobacco abuse       Past surgical history:  has a past surgical history that includes surgical history of - .     Medications:   Current Outpatient Medications:      amitriptyline (ELAVIL) 25 MG tablet, TAKE 1 TABLET BY MOUTH EVERY DAY AT BEDTIME FOR PREVENTING ANXIETY., Disp: 90 tablet, Rfl: 3     lisinopril (ZESTRIL) 20 MG tablet, Take 1 tablet (20 mg) by mouth daily, Disp: 90 tablet, Rfl: 3     neomycin-polymyxin-hydrocortisone (CORTISPORIN) 3.5-69526-8 otic suspension, Place 3 drops into both ears 4 times daily, Disp: 1 Bottle, Rfl: 3     pravastatin (PRAVACHOL) 80 MG tablet, Take 1 tablet (80 mg) by mouth daily, Disp: 90 tablet, Rfl: 3     ranitidine (ZANTAC) 150 MG tablet, Take 1 tablet (150 mg) by mouth 2 times daily, Disp: 180 tablet, Rfl: 3     Allergies:     Allergies   Allergen Reactions     Atorvastatin Cramps     Penicillins      Tape [Adhesive Tape]      Latex Rash        Family History: family history includes Breast Cancer in an other family member; C.A.D. in her brother; Cardiovascular in her father and maternal grandmother; Eye Disorder in her mother; Heart Disease (age of onset: 40) in her brother; Hypertension in her father; Lipids in her mother, sister, and sister.     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  reports that she has been smoking cigarettes. She has a 14.00 pack-year smoking history. She has never used smokeless tobacco. She reports that she does not drink alcohol or use drugs.    Review of Systems:     Denies numbness, tingling, parasthesias.   Denies headaches.   Denies fevers, chills, night sweats   Denies chest pain.   Denies shortness of breath.   Denies any skin problems, abrasions, rashes,  "irritation.    Physical Exam  GENERAL APPEARANCE: healthy, alert, no distress.   SKIN: no suspicious lesions or rashes  RESPIRATORY: No increased work of breathing.  NEURO: Normal strength and tone, mentation intact and speech normal  PSYCH:  mentation appears normal and affect normal. Not anxious.    BP (!) 141/90   Pulse 94   Ht 1.676 m (5' 6\")   Wt 83.5 kg (184 lb 1.6 oz)   LMP 11/04/2009   BMI 29.71 kg/m       right  UPPER EXTREMITY:    Sensation intact to light touch in median, radial, ulnar and axillary nerve distributions  Palpable 2+ radial pulse, brisk capillary refill to all fingers, wwp  Intact epl fpl fdp edc wrist flexion/extension biceps triceps deltoid    ELBOW:  Skin intact. No open wounds. No skin maceration.  Inspection: no swelling. No ecchymosis.  Tender: lateral epicondyle, common extensor tendon and radial head/neck  Non-tender: medial epicondyle  Range of Motion: full flexion/extension, near full forearm rotation limited by discomfort at extreme supination.  Strength: grossly intact, pain limited.  Special tests: normal stability:   There is no gross deformity in the area.      X-rays: 3 views right elbow 8/31/2020 reviewed, showing stable alignment of nondisplaced radial neck fracture with interval callus, sclerosis. Stable alignment to previous.    3 views right wrist, 2 views right elbow, 2 views right humerus from 7/26/2020 : Slight impacted, nondisplaced fracture of the radial head/neck junction. Cystic lesion with sclerotic rim ulnar aspect of the lunate. Small calcific, well-corticated density off tip of acromion at the shoulder.    Assessment: 55 year old female , right -hand dominant, with right upper extremity injury, nondisplaced radial head/neck fracture.    Plan:.  * reviewed xrays with patient. The fracture alignment is stable. There is some healing, not complete.    * gentle elbow range of motion in all planes, flexion, extension, supination, pronation as comfort allows. " Should increase daily.  * rest  * ice as needed.  * pain control (tylenol, nsaids as needed). Patient declines need for prescription.  * activity modification  * no heavy lifting, pushing, pulling; 3lbs lifting restriction.  * return to clinic 4 weeks, sooner if needed. 3 views elbow xrays.  * workability note provided today. 3lbs lifting restriction right upper extremity, unlimited use left upper extremity.      Ant Rizo M.D., M.S.  Dept. of Orthopaedic Surgery  [facility]      Again, thank you for allowing me to participate in the care of your patient.        Sincerely,        Ant Rizo MD

## 2020-08-31 NOTE — LETTER
[facility]  [address] [address]  [place] [id number] [phone]      WORKABILITY    [facility], Dr. Ant Rizo M.D., CHAST[facility] LizKaci bah [place]        8/31/2020      RE: Gala Phan    [address] [facility] [address]        To whom it may concern:     Gala Phan is under my professional care for   1. Closed nondisplaced fracture of neck of right radius, initial encounter    .     Date of injury: 7/23/20.     Return to work date: 9/1/20   Ms. Phan can return to work WITH RESTRICTIONS: 3 lbs maximum lifting restriction with right upper extremity. Unlimited use with left upper extremity.   DURATION OF LIMITATIONS: 4 weeks      Next appointment: 1 month        Víctor Benitez PA-C, CAQ (Ortho)  Supervising Physician: Ant Rizo M.D., M.S.  Dept. of Orthopaedic Surgery  [facility]

## 2020-08-31 NOTE — PROGRESS NOTES
"Chief Complaint:   Chief Complaint   Patient presents with     Right Elbow - RECHECK     Radial head/neck fracture. DOI: 7/23/20, 6 wk s/p. Patient notes her elbow is doing ok. She does have a pop in her elbow. She notes it catches or something and she will have to twist it or move it around to get it to release. Denies any pain.      INJURY: nondisplaced right radial neck fracture  DATE of INJURY: 7/23/2020    HPI: Gala Phan is a 55 year old female , right -hand dominant, who presents for evaluation and management of a right elbow injury. Returns today for following of nondisplaced radial neck fracture. Overall pain improved, no pain. Has some \"catching\" in the elbow with rotation. Using the arm for light activities. She'd like to be able to use it a little at work now.    The injury occurred on 7/23/2020 while falling off a chair while hanging drapes. She had just finished the drapes and was putting up the lighting, started to fall and fell on all fours, mostly on the right upper extremity. Onset of pain from the palm to the shoulder. Was seen in the emergency room on 7/26/2020 with xrays of the right upper extremity showing a nondisplaced radial neck fracture. Placed into a sling. It has been almost 6 weeks since the initial injury. Denies previous right upper extremity problems.        She reports having no pain/discomfort around the injury site. She denies numbness or tingling.   Associated symptoms: radiating pain to the wrist, \"catching\" in the elbow.  Aggravated by: activities  Relieved by: rest, not moving the elbow or using the arm.    Treatment up to this point:ice, NSAIDS and sling.  Prior history of related problems: no prior problems with this area in the past      Past medical history:  has no past medical history on file.   Patient Active Problem List   Diagnosis     Anxiety state     Esophageal reflux     Breast screening     HYPERLIPIDEMIA LDL GOAL <130     Hypertension goal BP (blood " pressure) < 140/90     Tobacco abuse       Past surgical history:  has a past surgical history that includes surgical history of - .     Medications:   Current Outpatient Medications:      amitriptyline (ELAVIL) 25 MG tablet, TAKE 1 TABLET BY MOUTH EVERY DAY AT BEDTIME FOR PREVENTING ANXIETY., Disp: 90 tablet, Rfl: 3     lisinopril (ZESTRIL) 20 MG tablet, Take 1 tablet (20 mg) by mouth daily, Disp: 90 tablet, Rfl: 3     neomycin-polymyxin-hydrocortisone (CORTISPORIN) 3.5-54008-2 otic suspension, Place 3 drops into both ears 4 times daily, Disp: 1 Bottle, Rfl: 3     pravastatin (PRAVACHOL) 80 MG tablet, Take 1 tablet (80 mg) by mouth daily, Disp: 90 tablet, Rfl: 3     ranitidine (ZANTAC) 150 MG tablet, Take 1 tablet (150 mg) by mouth 2 times daily, Disp: 180 tablet, Rfl: 3     Allergies:     Allergies   Allergen Reactions     Atorvastatin Cramps     Penicillins      Tape [Adhesive Tape]      Latex Rash        Family History: family history includes Breast Cancer in an other family member; C.A.D. in her brother; Cardiovascular in her father and maternal grandmother; Eye Disorder in her mother; Heart Disease (age of onset: 40) in her brother; Hypertension in her father; Lipids in her mother, sister, and sister.     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  reports that she has been smoking cigarettes. She has a 14.00 pack-year smoking history. She has never used smokeless tobacco. She reports that she does not drink alcohol or use drugs.    Review of Systems:     Denies numbness, tingling, parasthesias.   Denies headaches.   Denies fevers, chills, night sweats   Denies chest pain.   Denies shortness of breath.   Denies any skin problems, abrasions, rashes, irritation.    Physical Exam  GENERAL APPEARANCE: healthy, alert, no distress.   SKIN: no suspicious lesions or rashes  RESPIRATORY: No increased work of breathing.  NEURO: Normal strength and tone, mentation intact and speech normal  PSYCH:  mentation appears normal  "and affect normal. Not anxious.    BP (!) 141/90   Pulse 94   Ht 1.676 m (5' 6\")   Wt 83.5 kg (184 lb 1.6 oz)   LMP 11/04/2009   BMI 29.71 kg/m       right  UPPER EXTREMITY:    Sensation intact to light touch in median, radial, ulnar and axillary nerve distributions  Palpable 2+ radial pulse, brisk capillary refill to all fingers, wwp  Intact epl fpl fdp edc wrist flexion/extension biceps triceps deltoid    ELBOW:  Skin intact. No open wounds. No skin maceration.  Inspection: no swelling. No ecchymosis.  Tender: lateral epicondyle, common extensor tendon and radial head/neck  Non-tender: medial epicondyle  Range of Motion: full flexion/extension, near full forearm rotation limited by discomfort at extreme supination.  Strength: grossly intact, pain limited.  Special tests: normal stability:   There is no gross deformity in the area.      X-rays: 3 views right elbow 8/31/2020 reviewed, showing stable alignment of nondisplaced radial neck fracture with interval callus, sclerosis. Stable alignment to previous.    3 views right wrist, 2 views right elbow, 2 views right humerus from 7/26/2020 : Slight impacted, nondisplaced fracture of the radial head/neck junction. Cystic lesion with sclerotic rim ulnar aspect of the lunate. Small calcific, well-corticated density off tip of acromion at the shoulder.    Assessment: 55 year old female , right -hand dominant, with right upper extremity injury, nondisplaced radial head/neck fracture.    Plan:.  * reviewed xrays with patient. The fracture alignment is stable. There is some healing, not complete.    * gentle elbow range of motion in all planes, flexion, extension, supination, pronation as comfort allows. Should increase daily.  * rest  * ice as needed.  * pain control (tylenol, nsaids as needed). Patient declines need for prescription.  * activity modification  * no heavy lifting, pushing, pulling; 3lbs lifting restriction.  * return to clinic 4 weeks, sooner if needed. " 3 views elbow xrays.  * workability note provided today. 3lbs lifting restriction right upper extremity, unlimited use left upper extremity.      Ant Rizo M.D., M.S.  Dept. of Orthopaedic Surgery  [facility]

## 2020-09-28 ENCOUNTER — OFFICE VISIT (OUTPATIENT)
Dept: ORTHOPEDICS | Facility: CLINIC | Age: 55
End: 2020-09-28
Payer: COMMERCIAL

## 2020-09-28 ENCOUNTER — ANCILLARY PROCEDURE (OUTPATIENT)
Dept: GENERAL RADIOLOGY | Facility: CLINIC | Age: 55
End: 2020-09-28
Attending: ORTHOPAEDIC SURGERY
Payer: COMMERCIAL

## 2020-09-28 VITALS
DIASTOLIC BLOOD PRESSURE: 90 MMHG | OXYGEN SATURATION: 97 % | HEART RATE: 105 BPM | SYSTOLIC BLOOD PRESSURE: 145 MMHG | HEIGHT: 66 IN | WEIGHT: 184 LBS | BODY MASS INDEX: 29.57 KG/M2

## 2020-09-28 DIAGNOSIS — S52.134A CLOSED NONDISPLACED FRACTURE OF NECK OF RIGHT RADIUS, INITIAL ENCOUNTER: ICD-10-CM

## 2020-09-28 DIAGNOSIS — S52.134D CLOSED NONDISPLACED FRACTURE OF NECK OF RIGHT RADIUS WITH ROUTINE HEALING, SUBSEQUENT ENCOUNTER: Primary | ICD-10-CM

## 2020-09-28 PROCEDURE — 99207 ZZC FRACTURE CARE IN GLOBAL PERIOD: CPT | Performed by: ORTHOPAEDIC SURGERY

## 2020-09-28 PROCEDURE — 73080 X-RAY EXAM OF ELBOW: CPT | Mod: RT

## 2020-09-28 ASSESSMENT — MIFFLIN-ST. JEOR: SCORE: 1441.37

## 2020-09-28 NOTE — LETTER
September 28, 2020      Gala Phan  [address] [address] [id number]        To Whom It May Concern:    Gala Phan was seen in our clinic. She may return to work 09/29/20 without restrictions.      Sincerely,        Ant Rizo MD

## 2020-09-28 NOTE — LETTER
"    9/28/2020         RE: Gala Phan  [address] [facility] [address]        Dear Colleague,    Thank you for referring your patient, Gala Phan, to the [facility]. Please see a copy of my visit note below.    Chief Complaint:   Chief Complaint   Patient presents with     Right Elbow - Pain     RECHECK     right elbow recheck      INJURY: nondisplaced right radial neck fracture  DATE of INJURY: 7/23/2020    HPI: Gala Phan is a 55 year old female , right -hand dominant, who presents for evaluation and management of a right elbow injury, 9.5 weeks out. Returns today for following of nondisplaced radial neck fracture, doing well. No pain. The \"catching\" in the elbow with rotation has improved, only on occasion.. Using the arm for light activities. Has returned to work light duty without problems. Feels like she can return full, just taking it easy on her onw.    The injury occurred on 7/23/2020 while falling off a chair while hanging drapes. She had just finished the drapes and was putting up the lighting, started to fall and fell on all fours, mostly on the right upper extremity. Onset of pain from the palm to the shoulder. Was seen in the emergency room on 7/26/2020 with xrays of the right upper extremity showing a nondisplaced radial neck fracture.  Denies previous right upper extremity problems.        She reports having no pain/discomfort around the injury site. She denies numbness or tingling.     Prior history of related problems: no prior problems with this area in the past      Past medical history:  has no past medical history on file.   Patient Active Problem List   Diagnosis     Anxiety state     Esophageal reflux     Breast screening     HYPERLIPIDEMIA LDL GOAL <130     Hypertension goal BP (blood pressure) < 140/90     Tobacco abuse       Past surgical history:  has a past surgical history that includes surgical history of - . "     Medications:   Current Outpatient Medications:      amitriptyline (ELAVIL) 25 MG tablet, TAKE 1 TABLET BY MOUTH EVERY DAY AT BEDTIME FOR PREVENTING ANXIETY., Disp: 90 tablet, Rfl: 3     lisinopril (ZESTRIL) 20 MG tablet, Take 1 tablet (20 mg) by mouth daily, Disp: 90 tablet, Rfl: 3     neomycin-polymyxin-hydrocortisone (CORTISPORIN) 3.5-87938-0 otic suspension, Place 3 drops into both ears 4 times daily, Disp: 1 Bottle, Rfl: 3     pravastatin (PRAVACHOL) 80 MG tablet, Take 1 tablet (80 mg) by mouth daily, Disp: 90 tablet, Rfl: 3     ranitidine (ZANTAC) 150 MG tablet, Take 1 tablet (150 mg) by mouth 2 times daily, Disp: 180 tablet, Rfl: 3     Allergies:     Allergies   Allergen Reactions     Atorvastatin Cramps     Penicillins      Tape [Adhesive Tape]      Latex Rash        Family History: family history includes Breast Cancer in an other family member; C.A.D. in her brother; Cardiovascular in her father and maternal grandmother; Eye Disorder in her mother; Heart Disease (age of onset: 40) in her brother; Hypertension in her father; Lipids in her mother, sister, and sister.     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  reports that she has been smoking cigarettes. She has a 14.00 pack-year smoking history. She has never used smokeless tobacco. She reports that she does not drink alcohol or use drugs.    Review of Systems:     Denies numbness, tingling, parasthesias.   Denies headaches.   Denies fevers, chills, night sweats   Denies chest pain.   Denies shortness of breath.   Denies any skin problems, abrasions, rashes, irritation.    Physical Exam  GENERAL APPEARANCE: healthy, alert, no distress.   SKIN: no suspicious lesions or rashes  RESPIRATORY: No increased work of breathing.  NEURO: Normal strength and tone, mentation intact and speech normal  PSYCH:  mentation appears normal and affect normal. Not anxious.    BP (!) 145/90 (BP Location: Left arm, Patient Position: Sitting, Cuff Size: Adult Regular)   Pulse  "105   Ht 1.676 m (5' 6\")   Wt 83.5 kg (184 lb)   LMP 11/04/2009   SpO2 97%   BMI 29.70 kg/m       right  UPPER EXTREMITY:    Sensation intact to light touch in median, radial, ulnar and axillary nerve distributions  Palpable 2+ radial pulse, brisk capillary refill to all fingers, wwp  Intact epl fpl fdp edc wrist flexion/extension biceps triceps deltoid    ELBOW:  Skin intact. No open wounds. No skin maceration.  Inspection: no swelling. No ecchymosis.  Tender: none  nontender to palpation:  lateral epicondyle, common extensor tendon and radial head/neck  Non-tender: medial epicondyle  Range of Motion: full flexion/extension,  full forearm rotation with slight discomfort at extreme supination.  Strength: grossly intact  Special tests: normal stability:   There is no gross deformity in the area.      X-rays: 3 views right elbow 9/28/2020  reviewed, showing stable alignment of nondisplaced radial neck fracture with bone bridging, decreased fracture lucency barely visible. Stable alignment to previous.    3 views right wrist, 2 views right elbow, 2 views right humerus from 7/26/2020 : Slight impacted, nondisplaced fracture of the radial head/neck junction. Cystic lesion with sclerotic rim ulnar aspect of the lunate. Small calcific, well-corticated density off tip of acromion at the shoulder.      Assessment: 55 year old female , right -hand dominant, with right upper extremity injury, nondisplaced radial head/neck fracture.    Plan:.  * reviewed xrays with patient. The fracture alignment is stable. Fracture clinically healed.  * continue range of motion in all planes.  * gradually increased activities per comfort, let pain be your guide.  * rest as needed.  * ice as needed.  * pain control (tylenol, nsaids as needed). Patient declines need for prescription.  * activity modification as needed.  * return to clinic as needed.  * workability note provided today. Return to work starting 9/29/2020 without " restrictions.      Ant Rizo M.D., M.S.  Dept. of Orthopaedic Surgery  [facility]      Again, thank you for allowing me to participate in the care of your patient.        Sincerely,        Ant Rizo MD

## 2020-09-28 NOTE — PROGRESS NOTES
"Chief Complaint:   Chief Complaint   Patient presents with     Right Elbow - Pain     RECHECK     right elbow recheck      INJURY: nondisplaced right radial neck fracture  DATE of INJURY: 7/23/2020    HPI: Gala Phan is a 55 year old female , right -hand dominant, who presents for evaluation and management of a right elbow injury, 9.5 weeks out. Returns today for following of nondisplaced radial neck fracture, doing well. No pain. The \"catching\" in the elbow with rotation has improved, only on occasion.. Using the arm for light activities. Has returned to work light duty without problems. Feels like she can return full, just taking it easy on her onw.    The injury occurred on 7/23/2020 while falling off a chair while hanging drapes. She had just finished the drapes and was putting up the lighting, started to fall and fell on all fours, mostly on the right upper extremity. Onset of pain from the palm to the shoulder. Was seen in the emergency room on 7/26/2020 with xrays of the right upper extremity showing a nondisplaced radial neck fracture.  Denies previous right upper extremity problems.        She reports having no pain/discomfort around the injury site. She denies numbness or tingling.     Prior history of related problems: no prior problems with this area in the past      Past medical history:  has no past medical history on file.   Patient Active Problem List   Diagnosis     Anxiety state     Esophageal reflux     Breast screening     HYPERLIPIDEMIA LDL GOAL <130     Hypertension goal BP (blood pressure) < 140/90     Tobacco abuse       Past surgical history:  has a past surgical history that includes surgical history of - .     Medications:   Current Outpatient Medications:      amitriptyline (ELAVIL) 25 MG tablet, TAKE 1 TABLET BY MOUTH EVERY DAY AT BEDTIME FOR PREVENTING ANXIETY., Disp: 90 tablet, Rfl: 3     lisinopril (ZESTRIL) 20 MG tablet, Take 1 tablet (20 mg) by mouth daily, Disp: 90 tablet, " "Rfl: 3     neomycin-polymyxin-hydrocortisone (CORTISPORIN) 3.5-62340-8 otic suspension, Place 3 drops into both ears 4 times daily, Disp: 1 Bottle, Rfl: 3     pravastatin (PRAVACHOL) 80 MG tablet, Take 1 tablet (80 mg) by mouth daily, Disp: 90 tablet, Rfl: 3     ranitidine (ZANTAC) 150 MG tablet, Take 1 tablet (150 mg) by mouth 2 times daily, Disp: 180 tablet, Rfl: 3     Allergies:     Allergies   Allergen Reactions     Atorvastatin Cramps     Penicillins      Tape [Adhesive Tape]      Latex Rash        Family History: family history includes Breast Cancer in an other family member; C.A.D. in her brother; Cardiovascular in her father and maternal grandmother; Eye Disorder in her mother; Heart Disease (age of onset: 40) in her brother; Hypertension in her father; Lipids in her mother, sister, and sister.     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  reports that she has been smoking cigarettes. She has a 14.00 pack-year smoking history. She has never used smokeless tobacco. She reports that she does not drink alcohol or use drugs.    Review of Systems:     Denies numbness, tingling, parasthesias.   Denies headaches.   Denies fevers, chills, night sweats   Denies chest pain.   Denies shortness of breath.   Denies any skin problems, abrasions, rashes, irritation.    Physical Exam  GENERAL APPEARANCE: healthy, alert, no distress.   SKIN: no suspicious lesions or rashes  RESPIRATORY: No increased work of breathing.  NEURO: Normal strength and tone, mentation intact and speech normal  PSYCH:  mentation appears normal and affect normal. Not anxious.    BP (!) 145/90 (BP Location: Left arm, Patient Position: Sitting, Cuff Size: Adult Regular)   Pulse 105   Ht 1.676 m (5' 6\")   Wt 83.5 kg (184 lb)   LMP 11/04/2009   SpO2 97%   BMI 29.70 kg/m       right  UPPER EXTREMITY:    Sensation intact to light touch in median, radial, ulnar and axillary nerve distributions  Palpable 2+ radial pulse, brisk capillary refill to all " fingers, wwp  Intact epl fpl fdp edc wrist flexion/extension biceps triceps deltoid    ELBOW:  Skin intact. No open wounds. No skin maceration.  Inspection: no swelling. No ecchymosis.  Tender: none  nontender to palpation:  lateral epicondyle, common extensor tendon and radial head/neck  Non-tender: medial epicondyle  Range of Motion: full flexion/extension,  full forearm rotation with slight discomfort at extreme supination.  Strength: grossly intact  Special tests: normal stability:   There is no gross deformity in the area.      X-rays: 3 views right elbow 9/28/2020  reviewed, showing stable alignment of nondisplaced radial neck fracture with bone bridging, decreased fracture lucency barely visible. Stable alignment to previous.    3 views right wrist, 2 views right elbow, 2 views right humerus from 7/26/2020 : Slight impacted, nondisplaced fracture of the radial head/neck junction. Cystic lesion with sclerotic rim ulnar aspect of the lunate. Small calcific, well-corticated density off tip of acromion at the shoulder.      Assessment: 55 year old female , right -hand dominant, with right upper extremity injury, nondisplaced radial head/neck fracture.    Plan:.  * reviewed xrays with patient. The fracture alignment is stable. Fracture clinically healed.  * continue range of motion in all planes.  * gradually increased activities per comfort, let pain be your guide.  * rest as needed.  * ice as needed.  * pain control (tylenol, nsaids as needed). Patient declines need for prescription.  * activity modification as needed.  * return to clinic as needed.  * workability note provided today. Return to work starting 9/29/2020 without restrictions.      Ant Rizo M.D., M.S.  Dept. of Orthopaedic Surgery  [facility]

## 2021-03-25 DIAGNOSIS — E78.5 HYPERLIPIDEMIA LDL GOAL <130: ICD-10-CM

## 2021-03-25 DIAGNOSIS — I10 HYPERTENSION GOAL BP (BLOOD PRESSURE) < 140/90: ICD-10-CM

## 2021-03-25 DIAGNOSIS — F41.1 ANXIETY STATE: ICD-10-CM

## 2021-03-25 NOTE — LETTER
[facility]  [address] [place] [id number]  [phone]        March 26, 2021  Gala Phan  [address] [facility] [address]    Dear Gala,    I care about your health and have reviewed your health plan. I have reviewed your medical conditions, medication list, and lab results and am making recommendations based on this review, to better manage your health.    You are in particular need of attention regarding:  -Wellness (Physical) Visit     I am recommending that you:  -schedule a WELLNESS (Physical) APPOINTMENT with me.        Please call us at [phone] (or use [employer]) to address the above recommendations.     Thank you for trusting [facility] and we appreciate the opportunity to serve you.  We look forward to supporting your healthcare needs in the future.    Healthy Regards,      JOHNATHAN DRUMMOND MD/Jessica CORTEZ, RN, BSN

## 2021-03-26 RX ORDER — PRAVASTATIN SODIUM 80 MG/1
TABLET ORAL
Qty: 30 TABLET | Refills: 0 | Status: SHIPPED | OUTPATIENT
Start: 2021-03-26 | End: 2021-04-27

## 2021-03-26 RX ORDER — LISINOPRIL 20 MG/1
TABLET ORAL
Qty: 30 TABLET | Refills: 0 | Status: SHIPPED | OUTPATIENT
Start: 2021-03-26 | End: 2021-04-27

## 2021-03-26 NOTE — TELEPHONE ENCOUNTER
Medications are being filled for 1 time refill only due to:  Patient needs to be seen because it has been more than one year since last visit. Letter mailed.     Jessica CORTEZ RN, BSN

## 2021-04-24 DIAGNOSIS — I10 HYPERTENSION GOAL BP (BLOOD PRESSURE) < 140/90: ICD-10-CM

## 2021-04-24 DIAGNOSIS — F41.1 ANXIETY STATE: ICD-10-CM

## 2021-04-24 DIAGNOSIS — E78.5 HYPERLIPIDEMIA LDL GOAL <130: ICD-10-CM

## 2021-04-27 RX ORDER — PRAVASTATIN SODIUM 80 MG/1
TABLET ORAL
Qty: 30 TABLET | Refills: 0 | Status: SHIPPED | OUTPATIENT
Start: 2021-04-27 | End: 2021-05-13

## 2021-04-27 RX ORDER — LISINOPRIL 20 MG/1
TABLET ORAL
Qty: 30 TABLET | Refills: 0 | Status: SHIPPED | OUTPATIENT
Start: 2021-04-27 | End: 2021-05-13

## 2021-05-13 DIAGNOSIS — E78.5 HYPERLIPIDEMIA LDL GOAL <130: ICD-10-CM

## 2021-05-13 DIAGNOSIS — I10 HYPERTENSION GOAL BP (BLOOD PRESSURE) < 140/90: ICD-10-CM

## 2021-05-13 DIAGNOSIS — F41.1 ANXIETY STATE: ICD-10-CM

## 2021-05-13 RX ORDER — LISINOPRIL 20 MG/1
20 TABLET ORAL DAILY
Qty: 15 TABLET | Refills: 0 | Status: SHIPPED | OUTPATIENT
Start: 2021-05-13 | End: 2021-06-04

## 2021-05-13 RX ORDER — PRAVASTATIN SODIUM 80 MG/1
TABLET ORAL
Qty: 15 TABLET | Refills: 0 | Status: SHIPPED | OUTPATIENT
Start: 2021-05-13 | End: 2021-06-04

## 2021-05-13 NOTE — TELEPHONE ENCOUNTER
S-(situation): Patient is requesting prescription refills.    B-(background): Was given a 30 daysi refill on 4/27.    A-(assessment): Patient will need appointment as has one scheduled for 6/6. She will need a 15 day supply until she can be seen.She is aware her request is too soon and she will  when her current supply runs low.    R-(recommendations): 15 day refill sent to pharmacy.     Karissa STEEL RN

## 2021-05-24 DIAGNOSIS — F41.1 ANXIETY STATE: ICD-10-CM

## 2021-06-04 ENCOUNTER — OFFICE VISIT (OUTPATIENT)
Dept: FAMILY MEDICINE | Facility: CLINIC | Age: 56
End: 2021-06-04
Payer: COMMERCIAL

## 2021-06-04 VITALS
HEART RATE: 88 BPM | BODY MASS INDEX: 28.87 KG/M2 | WEIGHT: 179.6 LBS | SYSTOLIC BLOOD PRESSURE: 130 MMHG | RESPIRATION RATE: 18 BRPM | TEMPERATURE: 97.3 F | DIASTOLIC BLOOD PRESSURE: 80 MMHG | HEIGHT: 66 IN

## 2021-06-04 DIAGNOSIS — F41.1 ANXIETY STATE: ICD-10-CM

## 2021-06-04 DIAGNOSIS — Z23 NEED FOR VACCINATION: ICD-10-CM

## 2021-06-04 DIAGNOSIS — H92.03 OTALGIA OF BOTH EARS: ICD-10-CM

## 2021-06-04 DIAGNOSIS — E78.5 HYPERLIPIDEMIA LDL GOAL <130: ICD-10-CM

## 2021-06-04 DIAGNOSIS — Z12.11 SPECIAL SCREENING FOR MALIGNANT NEOPLASMS, COLON: ICD-10-CM

## 2021-06-04 DIAGNOSIS — I10 HYPERTENSION GOAL BP (BLOOD PRESSURE) < 140/90: Primary | ICD-10-CM

## 2021-06-04 DIAGNOSIS — R82.90 BAD ODOR OF URINE: ICD-10-CM

## 2021-06-04 LAB
ALBUMIN UR-MCNC: NEGATIVE MG/DL
ANION GAP SERPL CALCULATED.3IONS-SCNC: 8 MMOL/L (ref 3–14)
APPEARANCE UR: CLEAR
BILIRUB UR QL STRIP: NEGATIVE
BUN SERPL-MCNC: 11 MG/DL (ref 7–30)
CALCIUM SERPL-MCNC: 9.2 MG/DL (ref 8.5–10.1)
CHLORIDE SERPL-SCNC: 106 MMOL/L (ref 94–109)
CHOLEST SERPL-MCNC: 202 MG/DL
CO2 SERPL-SCNC: 24 MMOL/L (ref 20–32)
COLOR UR AUTO: YELLOW
CREAT SERPL-MCNC: 0.82 MG/DL (ref 0.52–1.04)
GFR SERPL CREATININE-BSD FRML MDRD: 80 ML/MIN/{1.73_M2}
GLUCOSE SERPL-MCNC: 96 MG/DL (ref 70–99)
GLUCOSE UR STRIP-MCNC: NEGATIVE MG/DL
HDLC SERPL-MCNC: 43 MG/DL
HGB UR QL STRIP: NEGATIVE
KETONES UR STRIP-MCNC: NEGATIVE MG/DL
LDLC SERPL CALC-MCNC: 113 MG/DL
LEUKOCYTE ESTERASE UR QL STRIP: NEGATIVE
NITRATE UR QL: NEGATIVE
NONHDLC SERPL-MCNC: 159 MG/DL
PH UR STRIP: 7 PH (ref 5–7)
POTASSIUM SERPL-SCNC: 4.1 MMOL/L (ref 3.4–5.3)
SODIUM SERPL-SCNC: 138 MMOL/L (ref 133–144)
SOURCE: NORMAL
SP GR UR STRIP: 1.01 (ref 1–1.03)
TRIGL SERPL-MCNC: 231 MG/DL
UROBILINOGEN UR STRIP-ACNC: 0.2 EU/DL (ref 0.2–1)

## 2021-06-04 PROCEDURE — 99214 OFFICE O/P EST MOD 30 MIN: CPT | Mod: 25 | Performed by: FAMILY MEDICINE

## 2021-06-04 PROCEDURE — 81003 URINALYSIS AUTO W/O SCOPE: CPT | Performed by: FAMILY MEDICINE

## 2021-06-04 PROCEDURE — 90714 TD VACC NO PRESV 7 YRS+ IM: CPT | Performed by: FAMILY MEDICINE

## 2021-06-04 PROCEDURE — 80061 LIPID PANEL: CPT | Performed by: FAMILY MEDICINE

## 2021-06-04 PROCEDURE — 80048 BASIC METABOLIC PNL TOTAL CA: CPT | Performed by: FAMILY MEDICINE

## 2021-06-04 PROCEDURE — 36415 COLL VENOUS BLD VENIPUNCTURE: CPT | Performed by: FAMILY MEDICINE

## 2021-06-04 PROCEDURE — 90471 IMMUNIZATION ADMIN: CPT | Performed by: FAMILY MEDICINE

## 2021-06-04 PROCEDURE — 90732 PPSV23 VACC 2 YRS+ SUBQ/IM: CPT | Performed by: FAMILY MEDICINE

## 2021-06-04 PROCEDURE — 90472 IMMUNIZATION ADMIN EACH ADD: CPT | Performed by: FAMILY MEDICINE

## 2021-06-04 RX ORDER — LISINOPRIL 20 MG/1
20 TABLET ORAL DAILY
Qty: 90 TABLET | Refills: 3 | Status: SHIPPED | OUTPATIENT
Start: 2021-06-04 | End: 2022-05-04

## 2021-06-04 RX ORDER — PRAVASTATIN SODIUM 80 MG/1
TABLET ORAL
Qty: 90 TABLET | Refills: 3 | Status: SHIPPED | OUTPATIENT
Start: 2021-06-04 | End: 2022-05-04

## 2021-06-04 RX ORDER — NEOMYCIN SULFATE, POLYMYXIN B SULFATE AND HYDROCORTISONE 10; 3.5; 1 MG/ML; MG/ML; [USP'U]/ML
3 SUSPENSION/ DROPS AURICULAR (OTIC) 4 TIMES DAILY
Qty: 10 ML | Refills: 3 | Status: SHIPPED | OUTPATIENT
Start: 2021-06-04 | End: 2023-06-19

## 2021-06-04 ASSESSMENT — MIFFLIN-ST. JEOR: SCORE: 1421.41

## 2021-06-04 ASSESSMENT — PAIN SCALES - GENERAL: PAINLEVEL: NO PAIN (0)

## 2021-06-04 NOTE — PROGRESS NOTES
ASSESSMENT:   (I10) Hypertension goal BP (blood pressure) < 140/90  (primary encounter diagnosis)  Comment: doing well  Plan: lisinopril (ZESTRIL) 20 MG tablet, Basic         metabolic panel        No change in current treatment plan.   Refills.   Monitor BP periodically.  This can be done at home, in clinic, at our pharmacy, at a store or by neighbor or relative with a blood pressure cuff.  You should be sitting and relaxed for several minutes when taking blood pressure.   Goal BP (most readings) should be less than 140/90    Normal blood pressure is 120/80.    If your blood pressure is consistently at or above the goal, some changes should be made with lifestyle or medication to keep blood pressure under good control.    High blood pressure over time can lead to eye and kidney damage and increase risk for heart attacks and strokes.   Let us know if readings are often high, so we can help get your blood pressure under good control.      (E78.5) Hyperlipidemia LDL goal <130  Comment: due for follow-up   Plan: pravastatin (PRAVACHOL) 80 MG tablet, Lipid         panel reflex to direct LDL Fasting        Fasting blood tests today.  REfills.     (H92.03) Otalgia of both ears  Comment:   Plan: neomycin-polymyxin-hydrocortisone (CORTISPORIN)        3.5-03029-3 otic suspension        REfills to use as needed for otitis externa.    (F41.1) Anxiety state  Comment: doing well  Plan: amitriptyline (ELAVIL) 25 MG tablet        REfills.  No change in current treatment plan.     (R82.90) Bad odor of urine  Comment: uncertain cause.  R/O urinary tract infection   Plan: *UA reflex to Microscopic and Culture ([place]         and [facility] (except [facility] and         [place])        Check urine today.     (Z12.11) Special screening for malignant neoplasms, colon  Comment:   Plan: Fecal colorectal cancer screen (FIT)        Complete FIT colon cancer screening test and send in the mail.      I do recommend PAP test.  Tetanus  vaccine today  Pneumovax shot today to prevent pneumonia.     Shingles vaccine is recommended for those adults age 50 and older.  The newer vaccine available since 2018 is very effective in preventing shingles (over 90%).  It is not currently covered by Medicare.  Check at pharmacy for this vaccine, they can check on your cost and give you the vaccine.   The vaccine may be less expensive at a pharmacy.      We discussed getting a COVID-19 virus vaccine.   The vaccine is very effective in preventing COVID-19 virus illness and the more serious complications including death.  It has emergency FDA approval.   We know getting the virus can be deadly or cause long term health problems.   It has been given to many millions of people across the world.   There is a very low chance of significant side effects.   Serious allergic reactions in roughly one in a million.     Three good reasons to get the vaccine;  1.  It is very good at preventing getting the COVID-19 virus illness.   2.  It is very good at preventing serious illness and death from the virus.   3.  For the good of our neighbors and society, if we all get the vaccine, there is less chance it can spread and save lives.        Mukund Salmeron is a 55 year old who presents for the following health issues  Chief Complaint   Patient presents with     Hypertension     Lipids      Last clinic visit: 3/25/2020 with me virtual visit .  No changes made at that visit.     Needs refills.  Feeling well.       Hyperlipidemia Follow-Up      Are you regularly taking any medication or supplement to lower your cholesterol?   Yes- Pravastatin    Are you having muscle aches or other side effects that you think could be caused by your cholesterol lowering medication?  No  Taking pravastatin  80 mg daily.      Hypertension Follow-up      Do you check your blood pressure regularly outside of the clinic? No     Are you following a low salt diet? Yes    Are your blood pressures ever more  "than 140 on the top number (systolic) OR more   than 90 on the bottom number (diastolic), for example 140/90? unknown      How many servings of fruits and vegetables do you eat daily?  2-3    On average, how many sweetened beverages do you drink each day (Examples: soda, juice, sweet tea, etc.  Do NOT count diet or artificially sweetened beverages)?   0    How many days per week do you exercise enough to make your heart beat faster? 7    How many minutes a day do you exercise enough to make your heart beat faster? 60 or more    How many days per week do you miss taking your medication? 0  Blood pressure medications currently taking: lisinopril 20mg daily.     Problem 3:   Also has had increase odor to urine.  Onset of symptoms: 1 month  No dysuria or frequency.    No vaginal symptoms.     Problem 4: anxiety.    Takes amitriptyline 25mg daily.   Doing well.     Patient Active Problem List   Diagnosis     Anxiety state     Esophageal reflux     Breast screening     HYPERLIPIDEMIA LDL GOAL <130     Hypertension goal BP (blood pressure) < 140/90     Tobacco abuse      Smokes 1/2 ppd.    She tried Chantix.  Had nightmares.     ROS:General: No change in weight, sleep or appetite.  Normal energy.  No fever or chills  Resp: No coughing, wheezing or shortness of breath  CV: No chest pains or palpitations  GI: No nausea, vomiting,  heartburn, abdominal pain, diarrhea, constipation or change in bowel habits  : No urinary frequency or dysuria, bladder or kidney problems  Musculoskeletal: No significant muscle or joint pains  Psychiatric: No problems with anxiety, depression or mental health    OBJECTIVE:Blood pressure 130/80, pulse 88, temperature 97.3  F (36.3  C), temperature source Tympanic, resp. rate 18, height 1.676 m (5' 6\"), weight 81.5 kg (179 lb 9.6 oz), last menstrual period 11/04/2009, not currently breastfeeding. BMI=Body mass index is 28.99 kg/m .  GENERAL APPEARANCE ADULT: Alert, no acute distress  NECK: No " adenopathy,masses or thyromegaly  RESP: lungs clear to auscultation   CV: normal rate, regular rhythm, no murmur or gallop  ABDOMEN: soft, no organomegaly, masses or tenderness  MS: extremities normal, no peripheral edema

## 2021-06-04 NOTE — NURSING NOTE
"Chief Complaint   Patient presents with     Hypertension     Lipids       Initial /80 (BP Location: Right arm, Patient Position: Chair, Cuff Size: Adult Regular)   Pulse 88   Temp 97.3  F (36.3  C) (Tympanic)   Resp 18   Ht 1.676 m (5' 6\")   Wt 81.5 kg (179 lb 9.6 oz)   LMP 11/04/2009   Breastfeeding No   BMI 28.99 kg/m   Estimated body mass index is 28.99 kg/m  as calculated from the following:    Height as of this encounter: 1.676 m (5' 6\").    Weight as of this encounter: 81.5 kg (179 lb 9.6 oz).    Patient presents to the clinic using No DME    Health Maintenance that is potentially due pending provider review:  Mammogram and Pap Smear    Pt will schedule future appt.    Is there anyone who you would like to be able to receive your results? No  If yes have patient fill out KATINA  Selene Lucio CMA    "

## 2021-06-04 NOTE — PATIENT INSTRUCTIONS
ASSESSMENT:   (I10) Hypertension goal BP (blood pressure) < 140/90  (primary encounter diagnosis)  Comment: doing well  Plan: lisinopril (ZESTRIL) 20 MG tablet, Basic         metabolic panel        No change in current treatment plan.   Refills.   Monitor BP periodically.  This can be done at home, in clinic, at our pharmacy, at a store or by neighbor or relative with a blood pressure cuff.  You should be sitting and relaxed for several minutes when taking blood pressure.   Goal BP (most readings) should be less than 140/90    Normal blood pressure is 120/80.    If your blood pressure is consistently at or above the goal, some changes should be made with lifestyle or medication to keep blood pressure under good control.    High blood pressure over time can lead to eye and kidney damage and increase risk for heart attacks and strokes.   Let us know if readings are often high, so we can help get your blood pressure under good control.      (E78.5) Hyperlipidemia LDL goal <130  Comment: due for follow-up   Plan: pravastatin (PRAVACHOL) 80 MG tablet, Lipid         panel reflex to direct LDL Fasting        Fasting blood tests today.  REfills.     (H92.03) Otalgia of both ears  Comment:   Plan: neomycin-polymyxin-hydrocortisone (CORTISPORIN)        3.5-98521-6 otic suspension        REfills to use as needed for otitis externa.    (F41.1) Anxiety state  Comment: doing well  Plan: amitriptyline (ELAVIL) 25 MG tablet        REfills.  No change in current treatment plan.     (R82.90) Bad odor of urine  Comment: uncertain cause.  R/O urinary tract infection   Plan: *UA reflex to Microscopic and Culture ([place]         and [facility] (except [facility] and         [place])        Check urine today.     (Z12.11) Special screening for malignant neoplasms, colon  Comment:   Plan: Fecal colorectal cancer screen (FIT)        Complete FIT colon cancer screening test and send in the mail.      I do recommend PAP test.   Tetanus  vaccine today  Pneumovax shot today to prevent pneumonia.     Shingles vaccine is recommended for those adults age 50 and older.  The newer vaccine available since 2018 is very effective in preventing shingles (over 90%).  It is not currently covered by Medicare.  Check at pharmacy for this vaccine, they can check on your cost and give you the vaccine.   The vaccine may be less expensive at a pharmacy.      We discussed getting a COVID-19 virus vaccine.   The vaccine is very effective in preventing COVID-19 virus illness and the more serious complications including death.  It has emergency FDA approval.   We know getting the virus can be deadly or cause long term health problems.   It has been given to many millions of people across the world.   There is a very low chance of significant side effects.   Serious allergic reactions in roughly one in a million.     Three good reasons to get the vaccine;  1.  It is very good at preventing getting the COVID-19 virus illness.   2.  It is very good at preventing serious illness and death from the virus.   3.  For the good of our neighbors and society, if we all get the vaccine, there is less chance it can spread and save lives.

## 2021-06-06 NOTE — RESULT ENCOUNTER NOTE
Please call.  Lipids abnormal.   Similar to past.   The blood chemistries (Basic metabolic panel) are all normal including electrolytes (salt balances in the blood), blood glucose and kidney tests.   Urine test is normal.   New cholesterol guidelines (from AHA/ACC pooled risk calculation) estimates 10 year risk of having a heart attack at about 8.9 %.  Any risk over 7.5% is considered significant and statin type medication is recommended to prevent heart attacks.    She has increased risk for heart attacks although most of this risk is related to smoking.   PLAN: Quitting smoking is the best way to lower risk for heart attacks.  Taking cholesterol lowering medication could also help a little if she were interested.       The 10-year ASCVD risk score (Emmanuel CAMPOS Jr., et al., 2013) is: 8.9%    Values used to calculate the score:      Age: 55 years      Sex: Female      Is Non- : No      Diabetic: No      Tobacco smoker: Yes      Systolic Blood Pressure: 130 mmHg      Is BP treated: Yes      HDL Cholesterol: 43 mg/dL      Total Cholesterol: 202 mg/dL

## 2022-01-20 ENCOUNTER — VIRTUAL VISIT (OUTPATIENT)
Dept: FAMILY MEDICINE | Facility: CLINIC | Age: 57
End: 2022-01-20
Payer: COMMERCIAL

## 2022-01-20 ENCOUNTER — TELEPHONE (OUTPATIENT)
Dept: FAMILY MEDICINE | Facility: CLINIC | Age: 57
End: 2022-01-20
Payer: COMMERCIAL

## 2022-01-20 DIAGNOSIS — J20.9 ACUTE BRONCHITIS WITH SYMPTOMS > 10 DAYS: Primary | ICD-10-CM

## 2022-01-20 DIAGNOSIS — U07.1 CLINICAL DIAGNOSIS OF COVID-19: ICD-10-CM

## 2022-01-20 PROCEDURE — 99213 OFFICE O/P EST LOW 20 MIN: CPT | Mod: 95 | Performed by: PHYSICIAN ASSISTANT

## 2022-01-20 RX ORDER — BENZONATATE 200 MG/1
200 CAPSULE ORAL 3 TIMES DAILY PRN
Qty: 30 CAPSULE | Refills: 0 | Status: SHIPPED | OUTPATIENT
Start: 2022-01-20 | End: 2022-06-01

## 2022-01-20 RX ORDER — ALBUTEROL SULFATE 90 UG/1
2 AEROSOL, METERED RESPIRATORY (INHALATION) EVERY 6 HOURS
Qty: 18 G | Refills: 0 | Status: SHIPPED | OUTPATIENT
Start: 2022-01-20 | End: 2022-06-01

## 2022-01-20 RX ORDER — CODEINE PHOSPHATE AND GUAIFENESIN 10; 100 MG/5ML; MG/5ML
1-2 SOLUTION ORAL EVERY 4 HOURS PRN
Qty: 118 ML | Refills: 0 | Status: SHIPPED | OUTPATIENT
Start: 2022-01-20 | End: 2022-06-01

## 2022-01-20 NOTE — TELEPHONE ENCOUNTER
Call placed to patient    She continues to have cough, 2 week duration. Interfering with sleep. Asking what she can take to stop cough.    Has tried OTC cough medication without help. Denies other symptoms, fever, body ache, has sense of taste and smell.    Reviewed home treatment. Virtual appointment scheduled.

## 2022-01-20 NOTE — PROGRESS NOTES
"Faviola is a 56 year old who is being evaluated via a billable video visit.      How would you like to obtain your AVS? Mail a copy  If the video visit is dropped, the invitation should be resent by: Text to cell phone: [phone]  Will anyone else be joining your video visit? No    Video Start Time: 3:04 PM    Assessment & Plan   Acute bronchitis with symptoms > 10 days  Patient with URI symptoms several weeks ago now with a persistent unchanged cough for the last 1 week since her Covid diagnosis. Suspect this to be a post viral bronchitis given lack of fevers, shortness of breath.  Will treat with albuterol and Tessalon Perles, Robitussin-AC as needed for cough.  Also recommended daily allergy medicine.  Return to clinic in 2 to 4 weeks if their symptoms are unchanged or sooner if there is any new, worsening, or concerning symptoms.  - albuterol (PROAIR HFA/PROVENTIL HFA/VENTOLIN HFA) 108 (90 Base) MCG/ACT inhaler; Inhale 2 puffs into the lungs every 6 hours  - benzonatate (TESSALON) 200 MG capsule; Take 1 capsule (200 mg) by mouth 3 times daily as needed for cough  - guaiFENesin-codeine (ROBITUSSIN AC) 100-10 MG/5ML solution; Take 5-10 mLs by mouth every 4 hours as needed for cough    Clinical diagnosis of COVID-19  Exposure to Covid at a shower where 9 individuals tested positive. Did not test due to likely exposure and consistent symptoms of Covid.     Tobacco Cessation:   reports that she has been smoking cigarettes. She has a 14.00 pack-year smoking history. She has never used smokeless tobacco.    BMI:   Estimated body mass index is 28.99 kg/m  as calculated from the following:    Height as of 6/4/21: 1.676 m (5' 6\").    Weight as of 6/4/21: 81.5 kg (179 lb 9.6 oz).     Return in about 2 weeks (around 2/3/2022), or if symptoms worsen or fail to improve, for In-Clinic Visit.    Shiv Haskins PA-C  [facility]    Subjective   Faviola is a 56 year old who presents for the following " health issues     HPI   Acute Illness  Acute illness concerns: Cough   Onset/Duration: one week   Symptoms:  Fever: no  Chills/Sweats: no  Headache (location?): YES  Sinus Pressure: YES  Conjunctivitis:  no  Ear Pain: no  Rhinorrhea: no  Congestion: YES  Sore Throat: no  Cough: YES-non-productive  Wheeze: no  Decreased Appetite: YES  Nausea: no  Vomiting: no  Diarrhea: no  Dysuria/Freq.: no  Dysuria or Hematuria: no  Fatigue/Achiness: no  Sick/Strep Exposure: no  Therapies tried and outcome: advil, aleve, benadryl, manpreet, robitussin     Review of Systems   See HPI       Objective           Vitals:  No vitals were obtained today due to virtual visit.    Physical Exam   GENERAL: Healthy, alert and no distress  EYES: Eyes grossly normal to inspection.  No discharge or erythema, or obvious scleral/conjunctival abnormalities.  RESP: No audible wheeze, cough, or visible cyanosis.  No visible retractions or increased work of breathing.    SKIN: Visible skin clear. No significant rash, abnormal pigmentation or lesions.  NEURO: Cranial nerves grossly intact.  Mentation and speech appropriate for age.  PSYCH: Mentation appears normal, affect normal/bright, judgement and insight intact, normal speech and appearance well-groomed.          Video-Visit Details    Type of service:  Video Visit    Video End Time:3:10 PM    Originating Location (pt. Location): Home    Distant Location (provider location):  [facility]     Platform used for Video Visit: Vivebio

## 2022-01-20 NOTE — TELEPHONE ENCOUNTER
Reason for Call:  Cough     Detailed comments: cough - Pt was sick two weeks ago lost her smell, sore throat never tested for Covid  Pt is asking for recommendations for her cough    Phone Number Patient can be reached at: Home number on file [phone] (home)    Best Time: Any Time      Can we leave a detailed message on this number? YES    Call taken on 1/20/2022 at 8:46 AM by Alyssa Pennington

## 2022-05-04 ENCOUNTER — TELEPHONE (OUTPATIENT)
Dept: FAMILY MEDICINE | Facility: CLINIC | Age: 57
End: 2022-05-04
Payer: COMMERCIAL

## 2022-05-04 DIAGNOSIS — F41.1 ANXIETY STATE: ICD-10-CM

## 2022-05-04 DIAGNOSIS — I10 HYPERTENSION GOAL BP (BLOOD PRESSURE) < 140/90: ICD-10-CM

## 2022-05-04 DIAGNOSIS — E78.5 HYPERLIPIDEMIA LDL GOAL <130: ICD-10-CM

## 2022-05-04 RX ORDER — LISINOPRIL 20 MG/1
20 TABLET ORAL DAILY
Qty: 90 TABLET | Refills: 0 | Status: SHIPPED | OUTPATIENT
Start: 2022-05-04 | End: 2022-06-01

## 2022-05-04 RX ORDER — PRAVASTATIN SODIUM 80 MG/1
TABLET ORAL
Qty: 90 TABLET | Refills: 0 | Status: SHIPPED | OUTPATIENT
Start: 2022-05-04 | End: 2022-06-01

## 2022-05-04 NOTE — TELEPHONE ENCOUNTER
Patient called for refills on her meds as she will run out before her appt on 6/1/2022.      Faviola Polanco, YAMILET Ferreira

## 2022-05-18 ENCOUNTER — HOSPITAL ENCOUNTER (OUTPATIENT)
Dept: MAMMOGRAPHY | Facility: CLINIC | Age: 57
Discharge: HOME | End: 2022-05-18
Attending: FAMILY MEDICINE | Admitting: FAMILY MEDICINE
Payer: COMMERCIAL

## 2022-05-18 DIAGNOSIS — Z12.31 VISIT FOR SCREENING MAMMOGRAM: ICD-10-CM

## 2022-05-18 PROCEDURE — 77067 SCR MAMMO BI INCL CAD: CPT

## 2022-06-01 ENCOUNTER — OFFICE VISIT (OUTPATIENT)
Dept: FAMILY MEDICINE | Facility: CLINIC | Age: 57
End: 2022-06-01
Payer: COMMERCIAL

## 2022-06-01 VITALS
OXYGEN SATURATION: 99 % | TEMPERATURE: 97.5 F | HEART RATE: 96 BPM | RESPIRATION RATE: 18 BRPM | WEIGHT: 180.6 LBS | BODY MASS INDEX: 29.15 KG/M2 | SYSTOLIC BLOOD PRESSURE: 120 MMHG | DIASTOLIC BLOOD PRESSURE: 82 MMHG

## 2022-06-01 DIAGNOSIS — Z12.4 CERVICAL CANCER SCREENING: ICD-10-CM

## 2022-06-01 DIAGNOSIS — I10 HYPERTENSION GOAL BP (BLOOD PRESSURE) < 140/90: Primary | ICD-10-CM

## 2022-06-01 DIAGNOSIS — Z12.11 SPECIAL SCREENING FOR MALIGNANT NEOPLASMS, COLON: ICD-10-CM

## 2022-06-01 DIAGNOSIS — Z72.0 TOBACCO ABUSE: ICD-10-CM

## 2022-06-01 DIAGNOSIS — E78.5 HYPERLIPIDEMIA LDL GOAL <130: ICD-10-CM

## 2022-06-01 DIAGNOSIS — F41.1 ANXIETY STATE: ICD-10-CM

## 2022-06-01 LAB
ANION GAP SERPL CALCULATED.3IONS-SCNC: 7 MMOL/L (ref 3–14)
BUN SERPL-MCNC: 11 MG/DL (ref 7–30)
CALCIUM SERPL-MCNC: 9.2 MG/DL (ref 8.5–10.1)
CHLORIDE BLD-SCNC: 106 MMOL/L (ref 94–109)
CHOLEST SERPL-MCNC: 209 MG/DL
CO2 SERPL-SCNC: 24 MMOL/L (ref 20–32)
CREAT SERPL-MCNC: 0.74 MG/DL (ref 0.52–1.04)
FASTING STATUS PATIENT QL REPORTED: YES
GFR SERPL CREATININE-BSD FRML MDRD: >90 ML/MIN/1.73M2
GLUCOSE BLD-MCNC: 91 MG/DL (ref 70–99)
HDLC SERPL-MCNC: 45 MG/DL
LDLC SERPL CALC-MCNC: 115 MG/DL
NONHDLC SERPL-MCNC: 164 MG/DL
POTASSIUM BLD-SCNC: 4.2 MMOL/L (ref 3.4–5.3)
SODIUM SERPL-SCNC: 137 MMOL/L (ref 133–144)
TRIGL SERPL-MCNC: 244 MG/DL

## 2022-06-01 PROCEDURE — 99214 OFFICE O/P EST MOD 30 MIN: CPT | Performed by: FAMILY MEDICINE

## 2022-06-01 PROCEDURE — 80061 LIPID PANEL: CPT | Performed by: FAMILY MEDICINE

## 2022-06-01 PROCEDURE — 36415 COLL VENOUS BLD VENIPUNCTURE: CPT | Performed by: FAMILY MEDICINE

## 2022-06-01 PROCEDURE — 80048 BASIC METABOLIC PNL TOTAL CA: CPT | Performed by: FAMILY MEDICINE

## 2022-06-01 RX ORDER — PRAVASTATIN SODIUM 80 MG/1
TABLET ORAL
Qty: 90 TABLET | Refills: 3 | Status: SHIPPED | OUTPATIENT
Start: 2022-06-01 | End: 2023-05-31

## 2022-06-01 RX ORDER — LISINOPRIL 20 MG/1
20 TABLET ORAL DAILY
Qty: 90 TABLET | Refills: 3 | Status: SHIPPED | OUTPATIENT
Start: 2022-06-01 | End: 2023-05-31

## 2022-06-01 ASSESSMENT — ANXIETY QUESTIONNAIRES
GAD7 TOTAL SCORE: 0
7. FEELING AFRAID AS IF SOMETHING AWFUL MIGHT HAPPEN: NOT AT ALL
GAD7 TOTAL SCORE: 0
5. BEING SO RESTLESS THAT IT IS HARD TO SIT STILL: NOT AT ALL
1. FEELING NERVOUS, ANXIOUS, OR ON EDGE: NOT AT ALL
6. BECOMING EASILY ANNOYED OR IRRITABLE: NOT AT ALL
2. NOT BEING ABLE TO STOP OR CONTROL WORRYING: NOT AT ALL
3. WORRYING TOO MUCH ABOUT DIFFERENT THINGS: NOT AT ALL

## 2022-06-01 ASSESSMENT — PATIENT HEALTH QUESTIONNAIRE - PHQ9
SUM OF ALL RESPONSES TO PHQ QUESTIONS 1-9: 1
5. POOR APPETITE OR OVEREATING: NOT AT ALL

## 2022-06-01 ASSESSMENT — PAIN SCALES - GENERAL: PAINLEVEL: NO PAIN (0)

## 2022-06-01 NOTE — NURSING NOTE
"Chief Complaint   Patient presents with     Anxiety     Hypertension     Hyperlipidemia     Derm Problem       Initial /82   Pulse 96   Temp 97.5  F (36.4  C) (Tympanic)   Resp 18   Wt 81.9 kg (180 lb 9.6 oz)   LMP 11/04/2009   SpO2 99%   BMI 29.15 kg/m   Estimated body mass index is 29.15 kg/m  as calculated from the following:    Height as of 6/4/21: 1.676 m (5' 6\").    Weight as of this encounter: 81.9 kg (180 lb 9.6 oz).    Patient presents to the clinic using     Health Maintenance that is potentially due pending provider review:    Pap discussed       Is there anyone who you would like to be able to receive your results?   If yes have patient fill out KATINA    "

## 2022-06-01 NOTE — PATIENT INSTRUCTIONS
ASSESSMENT:   (I10) Hypertension goal BP (blood pressure) < 140/90  (primary encounter diagnosis)  Comment: doing well  Plan: lisinopril (ZESTRIL) 20 MG tablet, Basic         metabolic panel        No change in current treatment plan.   REfills.     (F41.1) Anxiety state  Comment: doing well  Plan: amitriptyline (ELAVIL) 25 MG tablet        No change in current treatment plan.   REfills.     (E78.5) Hyperlipidemia LDL goal <130  Comment: due for follow-up   Plan: pravastatin (PRAVACHOL) 80 MG tablet, Lipid         panel reflex to direct LDL Fasting        Fasting blood tests today.   REfills.     (Z12.4) Cervical cancer screening  Comment: due for PAP test.  Last one 2012  Plan: Schedule an appointment for PAP test.     (Z72.0) Tobacco abuse  Comment: continuing to smoke  Plan: I recommended quitting smoking and using tobacco products.     I am sure you are aware of the many health benefits from quitting.   They include for smokers; wrinkling/premature aging of the skin, higher risk for lots of different cancers (including lung cancer), emphysema and much higher risk for heart attacks and strokes.  For smokeless tobacco users, there are mouth, throat and tongue cancers among others.   For vaping tobacco, there is still considerable risk for lung damage, cancer risks from other chemicals inhaled into the lungs.  Many people have  from lung disease related to vaping.     When you are ready to quit smoking, there are various medications available to help if needed including Chantix, Zyban or nicotine replacement products.  On line counseling is also available through the [facility].   We can help with quitting, contact us.     If you are currently smoking or using tobacco, you may want to check out the following information if you are interested in quitting:  QuitPartner: www.quitpartnermn.com or  QUIT-NOW for access to resources including one-on-one coaching and free medications like nicotine patches,  gum, or lozenges.  SmokeFree: www.smokefree.gov provides a 3 step by step quitting , smart phone application recommendations, live chat, and other resources.  Nicotine Anonymous:www.nicotine-anonymous.org to find meeting near you (virtual as well).     Astrid resources for helping quit smoking: https://www.Scan.org/patient-education/113745     (Z12.11) Special screening for malignant neoplasms, colon  Comment:   Plan: Fecal colorectal cancer screen (FIT)        Complete FIT colon cancer screening test and send in the mail.      Vaccines recommended:  COVID-19 virus vaccine.   Shingles vaccine is recommended for those adults age 50 and older.  The newer vaccine available since 2018 is very effective in preventing shingles (over 90%).  It is not currently covered by Medicare.  Check at pharmacy for this vaccine, they can check on your cost and give you the vaccine.   The vaccine may be less expensive at a pharmacy. .    Does not qualify for lung cancer CT screening test.   18.5 pack year smoking history.     I can remove skin tags at another 20 minute appointment

## 2022-06-01 NOTE — LETTER
June 2, 2022      Faviola Phan  [address] [facility] [address]        Dear ,    We are writing to inform you of your test results.  Lipids all a little abnormal.   The blood chemistries (Basic metabolic panel) are all normal including electrolytes (salt balances in the blood), blood glucose and kidney tests.   PLAN: Weight loss, regular exercise with goal of 30 minutes most days of the week and eating a prudent diet (lots of fruits and vegetables, limiting unhealthy fats and excessive calories) can help improve lipids.  Since you have not tolerated atorvastatin in the past, continue pravastatin as you have been.        Resulted Orders   Lipid panel reflex to direct LDL Fasting   Result Value Ref Range    Cholesterol 209 (H) <200 mg/dL    Triglycerides 244 (H) <150 mg/dL    Direct Measure HDL 45 (L) >=50 mg/dL    LDL Cholesterol Calculated 115 (H) <=100 mg/dL    Non HDL Cholesterol 164 (H) <130 mg/dL    Patient Fasting > 8hrs? Yes     Narrative    Cholesterol  Desirable:  <200 mg/dL    Triglycerides  Normal:  Less than 150 mg/dL  Borderline High:  150-199 mg/dL  High:  200-499 mg/dL  Very High:  Greater than or equal to 500 mg/dL    Direct Measure HDL  Female:  Greater than or equal to 50 mg/dL   Male:  Greater than or equal to 40 mg/dL    LDL Cholesterol  Desirable:  <100mg/dL  Above Desirable:  100-129 mg/dL   Borderline High:  130-159 mg/dL   High:  160-189 mg/dL   Very High:  >= 190 mg/dL    Non HDL Cholesterol  Desirable:  130 mg/dL  Above Desirable:  130-159 mg/dL  Borderline High:  160-189 mg/dL  High:  190-219 mg/dL  Very High:  Greater than or equal to 220 mg/dL   Basic metabolic panel   Result Value Ref Range    Sodium 137 133 - 144 mmol/L    Potassium 4.2 3.4 - 5.3 mmol/L    Chloride 106 94 - 109 mmol/L    Carbon Dioxide (CO2) 24 20 - 32 mmol/L    Anion Gap 7 3 - 14 mmol/L    Urea Nitrogen 11 7 - 30 mg/dL    Creatinine 0.74 0.52 - 1.04 mg/dL    Calcium 9.2 8.5 - 10.1 mg/dL     Glucose 91 70 - 99 mg/dL    GFR Estimate >90 >60 mL/min/1.73m2      Comment:      Effective December 21, 2021 eGFRcr in adults is calculated using the 2021 CKD-EPI creatinine equation which includes age and gender (Meg et al., NEJM, DOI: 10.1056/ILOBnx3458427)       If you have any questions or concerns, please call the clinic at the number listed above.       Sincerely,      Adam Maher MD/carolina

## 2022-06-02 NOTE — RESULT ENCOUNTER NOTE
Please call. Lipids all a little abnormal.  The blood chemistries (Basic metabolic panel) are all normal including electrolytes (salt balances in the blood), blood glucose and kidney tests.   PLAN: Weight loss, regular exercise with goal of 30 minutes most days of the week and eating a prudent diet (lots of fruits and vegetables, limiting unhealthy fats and excessive calories) can help improve lipids.  She has not tolerated atorvastatin in the past.   Continue pravastatin as a she has been.

## 2022-06-06 ENCOUNTER — OFFICE VISIT (OUTPATIENT)
Dept: FAMILY MEDICINE | Facility: CLINIC | Age: 57
End: 2022-06-06
Payer: COMMERCIAL

## 2022-06-06 VITALS
DIASTOLIC BLOOD PRESSURE: 85 MMHG | SYSTOLIC BLOOD PRESSURE: 126 MMHG | TEMPERATURE: 97.2 F | OXYGEN SATURATION: 98 % | RESPIRATION RATE: 22 BRPM | HEART RATE: 89 BPM

## 2022-06-06 DIAGNOSIS — L91.8 SKIN TAG: Primary | ICD-10-CM

## 2022-06-06 PROCEDURE — 11200 RMVL SKIN TAGS UP TO&INC 15: CPT | Performed by: FAMILY MEDICINE

## 2022-06-06 NOTE — PROGRESS NOTES
ASSESSMENT:   (L91.8) Skin tag  (primary encounter diagnosis)  Comment: benign  Plan:  Skin tags # 7 are snipped off after using Betadine for prep and  sterile forceps and iris scissors. Local anesthesia with 1% xylocaine with epinephrine for the eyelid skin tags. Electrocautery used on the base.    I did not use any anesthesia for the neck skin tags.  Electrocautery to the base of some but not all of the skin tags.  No dressings needed.    These pathognomonic lesions are not sent for pathology.          Mukund Salmeron is a 56 year old who presents for the following health issues   Chief Complaint   Patient presents with     Procedure     Skin tag removal       No other health concerns today.     Patient Active Problem List   Diagnosis     Anxiety state     Esophageal reflux     Breast screening     HYPERLIPIDEMIA LDL GOAL <130     Hypertension goal BP (blood pressure) < 140/90     Tobacco abuse      OBJECTIVE:Blood pressure 126/85, pulse 89, temperature 97.2  F (36.2  C), temperature source Tympanic, resp. rate 22, last menstrual period 11/04/2009, SpO2 98 %, not currently breastfeeding. BMI=There is no height or weight on file to calculate BMI.  GENERAL APPEARANCE ADULT: Alert, no acute distress  SKIN: She had multiple pedunculated tiny skin tags on her neck they were all 1 or 2 mm.  There were 3 in the right lateral neck 2 on the left side.  She also had 1 small skin tag upper left eyelid and one on the left lower eyelid.

## 2022-06-15 ENCOUNTER — APPOINTMENT (OUTPATIENT)
Dept: LAB | Facility: CLINIC | Age: 57
End: 2022-06-15
Payer: COMMERCIAL

## 2022-06-15 PROCEDURE — 82274 ASSAY TEST FOR BLOOD FECAL: CPT

## 2022-06-19 DIAGNOSIS — Z12.11 SPECIAL SCREENING FOR MALIGNANT NEOPLASMS, COLON: ICD-10-CM

## 2022-06-19 LAB — HEMOCCULT STL QL IA: NEGATIVE

## 2022-06-19 NOTE — LETTER
June 21, 2022      Faviola Pooleall  [address] [facility] [address]        Dear MsJose RaulSylvester,    We are writing to inform you of your test results.  The colon cancer screening test (FIT test) is negative for sign of colon cancer.   PLAN: Repeat once every year.         Resulted Orders   Fecal colorectal cancer screen (FIT)   Result Value Ref Range    Occult Blood Screen FIT Negative Negative       If you have any questions or concerns, please call the clinic at the number listed above.       Sincerely,      Adam Maher MD/carolina

## 2022-06-19 NOTE — RESULT ENCOUNTER NOTE
Please call. The colon cancer screening test (FIT test) is negative for sign of colon cancer.   PLAN: Repeat once every year.

## 2023-03-13 ENCOUNTER — HOSPITAL ENCOUNTER (EMERGENCY)
Facility: CLINIC | Age: 58
Discharge: HOME | End: 2023-03-13
Payer: COMMERCIAL

## 2023-03-13 VITALS
HEART RATE: 105 BPM | TEMPERATURE: 97.1 F | RESPIRATION RATE: 20 BRPM | DIASTOLIC BLOOD PRESSURE: 89 MMHG | OXYGEN SATURATION: 98 % | SYSTOLIC BLOOD PRESSURE: 143 MMHG

## 2023-03-13 DIAGNOSIS — J20.9 ACUTE BRONCHITIS WITH SYMPTOMS > 10 DAYS: ICD-10-CM

## 2023-03-13 PROCEDURE — G0463 HOSPITAL OUTPT CLINIC VISIT: HCPCS

## 2023-03-13 PROCEDURE — 99213 OFFICE O/P EST LOW 20 MIN: CPT

## 2023-03-13 RX ORDER — ALBUTEROL SULFATE 90 UG/1
2 AEROSOL, METERED RESPIRATORY (INHALATION) EVERY 6 HOURS PRN
Qty: 18 G | Refills: 0 | Status: SHIPPED | OUTPATIENT
Start: 2023-03-13 | End: 2023-06-19

## 2023-03-13 RX ORDER — BENZONATATE 200 MG/1
200 CAPSULE ORAL 3 TIMES DAILY PRN
Qty: 30 CAPSULE | Refills: 0 | Status: SHIPPED | OUTPATIENT
Start: 2023-03-13 | End: 2023-06-19

## 2023-03-13 ASSESSMENT — ENCOUNTER SYMPTOMS
WHEEZING: 1
WEAKNESS: 0
HEADACHES: 1
NAUSEA: 0
DIARRHEA: 0
FEVER: 0
FATIGUE: 0
COUGH: 1
ABDOMINAL PAIN: 0
SHORTNESS OF BREATH: 0
DIZZINESS: 0
SORE THROAT: 0
VOMITING: 0

## 2023-03-13 NOTE — ED PROVIDER NOTES
History     Chief Complaint   Patient presents with     Cough     HPI  Gala Phan is a 57 year old female who presents to the  for complaint of cough as is concerned for bronchitis. Patient reports that she developed cold-like symptoms about two weeks ago. States symptoms improved and she then developed another cold. Symptoms have largely improved but she continues to have a cough and some chest tightness. Endorses hearing some wheezing at times. Coughing is productive at times with a clear phlegm. No significant shortness of breath or chest pain. Some ear discomfort at times.  Denies fever, headache, dizziness, congestion, sore throat, cough, shortness of breath, chest pain, abdominal pain, nausea, vomiting, diarrhea, dysuria, hematuria and rash. Patient reports similar symptoms about a year ago and is requesting an inhaler as she got the last time.       Allergies:  Allergies   Allergen Reactions     Atorvastatin Cramps     Pcn [Penicillins]      Tape [Adhesive Tape]      Latex Rash       Problem List:    Patient Active Problem List    Diagnosis Date Noted     Tobacco abuse 02/09/2015     Priority: Medium     Hypertension goal BP (blood pressure) < 140/90 12/01/2011     Priority: Medium     HYPERLIPIDEMIA LDL GOAL <130 10/31/2010     Priority: Medium     Breast screening 03/18/2007     Priority: Medium     3/19/2007: Recommended close follow up breast exams-monthly self exams and mammogram in 6 months followed by surgery recheck. See path report.  Problem list name updated by automated process. Provider to review       Anxiety state 03/22/2006     Priority: Medium     12/1/2011:She has been taking amitriptyline for the anxiety with good results  Problem list name updated by automated process. Provider to review       Esophageal reflux 03/22/2006     Priority: Medium        Past Medical History:    No past medical history on file.    Past Surgical History:    Past Surgical History:   Procedure  Laterality Date     SURGICAL HISTORY OF -       Breast biopsy - benign       Family History:    Family History   Problem Relation Age of Onset     Lipids Mother      Eye Disorder Mother         degenerative disease     Hypertension Father      Cardiovascular Father         Blood clots, legs     Cardiovascular Maternal Grandmother      Lipids Sister      Lipids Sister      C.A.D. Brother         MI in mid 40s     Heart Disease Brother 40        MI     Breast Cancer Other         Pat cousin        Social History:  Marital Status:   [2]  Social History     Tobacco Use     Smoking status: Every Day     Packs/day: 0.50     Years: 37.00     Pack years: 18.50     Types: Cigarettes     Smokeless tobacco: Never     Tobacco comments:     started at age 19.  2/12/2010:smoking 1/4-1/2 ppd.    Vaping Use     Vaping Use: Never used   Substance Use Topics     Alcohol use: No     Drug use: No        Medications:    albuterol (PROAIR HFA/PROVENTIL HFA/VENTOLIN HFA) 108 (90 Base) MCG/ACT inhaler  benzonatate (TESSALON) 200 MG capsule  amitriptyline (ELAVIL) 25 MG tablet  lisinopril (ZESTRIL) 20 MG tablet  neomycin-polymyxin-hydrocortisone (CORTISPORIN) 3.5-55776-2 otic suspension  pravastatin (PRAVACHOL) 80 MG tablet          Review of Systems   Constitutional: Negative for fatigue and fever.   HENT: Positive for ear pain. Negative for congestion and sore throat.    Respiratory: Positive for cough and wheezing. Negative for shortness of breath.    Cardiovascular: Negative for chest pain.   Gastrointestinal: Negative for abdominal pain, diarrhea, nausea and vomiting.   Skin: Negative for rash.   Neurological: Positive for headaches. Negative for dizziness and weakness.   All other systems reviewed and are negative.      Physical Exam   BP: (!) 143/89  Pulse: 105  Temp: 97.1  F (36.2  C)  Resp: 20  SpO2: 98 %      Physical Exam  Constitutional:       General: She is not in acute distress.     Appearance: Normal appearance. She  is not diaphoretic.   HENT:      Head: Atraumatic.      Right Ear: Tympanic membrane normal.      Left Ear: Tympanic membrane normal.      Nose: No congestion or rhinorrhea.      Mouth/Throat:      Mouth: Mucous membranes are moist.      Pharynx: No oropharyngeal exudate or posterior oropharyngeal erythema.   Eyes:      General: No scleral icterus.  Cardiovascular:      Rate and Rhythm: Normal rate and regular rhythm.      Heart sounds: Normal heart sounds. No murmur heard.  Pulmonary:      Effort: No respiratory distress.      Breath sounds: Normal breath sounds.   Abdominal:      General: Bowel sounds are normal.      Palpations: Abdomen is soft.      Tenderness: There is no abdominal tenderness.   Musculoskeletal:         General: No tenderness.      Cervical back: Neck supple. No tenderness.   Lymphadenopathy:      Cervical: Cervical adenopathy present.   Skin:     General: Skin is warm.      Findings: No rash.   Neurological:      Mental Status: She is alert.         ED Course                 Procedures      No results found for this or any previous visit (from the past 24 hour(s)).    Medications - No data to display    Assessments & Plan (with Medical Decision Making)   Patient presented for concern for bronchitis. Patient is afebrile on arrival. She does not appear in any acute distress. No indication for chest xray today.  Lung sounds are clear to auscultation.  No signs of otitis media on exam.  No indication for strep testing today.  Patient is not having any shortness of breath or chest pain. Patient provided with a albuterol inhaler as requested.  Also given Tessalon Perles for symptomatic cough management.  Recommended that she follow-up with primary doctor if symptoms are not improving.  Discussed with her that antibiotics are not first-line treatment at this time. Provided with reassurance and education on duration of viral cough and illness course.  Patient is agreeable to the above plan and was  discharged in good condition.        I have reviewed the nursing notes.    I have reviewed the findings, diagnosis, plan and need for follow up with the patient.        New Prescriptions    ALBUTEROL (PROAIR HFA/PROVENTIL HFA/VENTOLIN HFA) 108 (90 BASE) MCG/ACT INHALER    Inhale 2 puffs into the lungs every 6 hours as needed for shortness of breath, wheezing or cough    BENZONATATE (TESSALON) 200 MG CAPSULE    Take 1 capsule (200 mg) by mouth 3 times daily as needed for cough       Final diagnoses:   Acute bronchitis with symptoms > 10 days       3/13/2023   [facility] EMERGENCY DEPT     Nestor May, ZACHARY SIN  03/13/23 [id number]

## 2023-03-20 ENCOUNTER — TELEPHONE (OUTPATIENT)
Dept: FAMILY MEDICINE | Facility: CLINIC | Age: 58
End: 2023-03-20
Payer: COMMERCIAL

## 2023-03-20 ENCOUNTER — TELEPHONE (OUTPATIENT)
Dept: FAMILY MEDICINE | Facility: CLINIC | Age: 58
End: 2023-03-20

## 2023-03-20 ENCOUNTER — ANCILLARY PROCEDURE (OUTPATIENT)
Dept: GENERAL RADIOLOGY | Facility: CLINIC | Age: 58
End: 2023-03-20
Payer: COMMERCIAL

## 2023-03-20 ENCOUNTER — OFFICE VISIT (OUTPATIENT)
Dept: URGENT CARE | Facility: URGENT CARE | Age: 58
End: 2023-03-20
Payer: COMMERCIAL

## 2023-03-20 VITALS
TEMPERATURE: 97.9 F | BODY MASS INDEX: 29.86 KG/M2 | SYSTOLIC BLOOD PRESSURE: 114 MMHG | OXYGEN SATURATION: 96 % | WEIGHT: 185 LBS | HEART RATE: 109 BPM | DIASTOLIC BLOOD PRESSURE: 81 MMHG

## 2023-03-20 DIAGNOSIS — J30.9 ALLERGIC RHINITIS, UNSPECIFIED SEASONALITY, UNSPECIFIED TRIGGER: Primary | ICD-10-CM

## 2023-03-20 DIAGNOSIS — J40 BRONCHITIS: ICD-10-CM

## 2023-03-20 LAB
BASOPHILS # BLD AUTO: 0.1 10E3/UL (ref 0–0.2)
BASOPHILS NFR BLD AUTO: 1 %
EOSINOPHIL # BLD AUTO: 0.8 10E3/UL (ref 0–0.7)
EOSINOPHIL NFR BLD AUTO: 7 %
ERYTHROCYTE [DISTWIDTH] IN BLOOD BY AUTOMATED COUNT: 12.9 % (ref 10–15)
HCT VFR BLD AUTO: 45.5 % (ref 35–47)
HGB BLD-MCNC: 15.3 G/DL (ref 11.7–15.7)
IMM GRANULOCYTES # BLD: 0 10E3/UL
IMM GRANULOCYTES NFR BLD: 0 %
LYMPHOCYTES # BLD AUTO: 2.8 10E3/UL (ref 0.8–5.3)
LYMPHOCYTES NFR BLD AUTO: 24 %
MCH RBC QN AUTO: 29.7 PG (ref 26.5–33)
MCHC RBC AUTO-ENTMCNC: 33.6 G/DL (ref 31.5–36.5)
MCV RBC AUTO: 88 FL (ref 78–100)
MONOCYTES # BLD AUTO: 0.9 10E3/UL (ref 0–1.3)
MONOCYTES NFR BLD AUTO: 8 %
NEUTROPHILS # BLD AUTO: 7.2 10E3/UL (ref 1.6–8.3)
NEUTROPHILS NFR BLD AUTO: 61 %
PLATELET # BLD AUTO: 373 10E3/UL (ref 150–450)
RBC # BLD AUTO: 5.15 10E6/UL (ref 3.8–5.2)
WBC # BLD AUTO: 11.8 10E3/UL (ref 4–11)

## 2023-03-20 PROCEDURE — 71046 X-RAY EXAM CHEST 2 VIEWS: CPT | Mod: TC | Performed by: RADIOLOGY

## 2023-03-20 PROCEDURE — 99214 OFFICE O/P EST MOD 30 MIN: CPT

## 2023-03-20 PROCEDURE — 85025 COMPLETE CBC W/AUTO DIFF WBC: CPT

## 2023-03-20 PROCEDURE — 36415 COLL VENOUS BLD VENIPUNCTURE: CPT

## 2023-03-20 RX ORDER — CETIRIZINE HYDROCHLORIDE 10 MG/1
10 TABLET ORAL DAILY
Qty: 30 TABLET | Refills: 0 | Status: SHIPPED | OUTPATIENT
Start: 2023-03-20 | End: 2024-06-11

## 2023-03-20 RX ORDER — PREDNISONE 10 MG/1
10 TABLET ORAL DAILY
Qty: 5 TABLET | Refills: 0 | Status: SHIPPED | OUTPATIENT
Start: 2023-03-20 | End: 2023-03-25

## 2023-03-20 RX ORDER — FLUTICASONE PROPIONATE 220 UG/1
1 AEROSOL, METERED RESPIRATORY (INHALATION) 2 TIMES DAILY
Qty: 12 G | Refills: 0 | Status: SHIPPED | OUTPATIENT
Start: 2023-03-20 | End: 2023-06-19

## 2023-03-20 RX ORDER — AZELASTINE HCL 205.5 UG/1
2 SPRAY NASAL
Qty: 1 ML | Refills: 0 | Status: SHIPPED | OUTPATIENT
Start: 2023-03-20 | End: 2023-06-19

## 2023-03-20 NOTE — TELEPHONE ENCOUNTER
Reason for call:  Patient reporting a symptom    Symptom or request: Pt was seen in ED 3/13 and is not feeling any better, despite taking prescribed Rxs.  Please call patient and advise.      Duration (how long have symptoms been present): ongoing    Have you been treated for this before? Yes    Additional comments:     Phone Number patient can be reached at:  Other phone number:  [phone]    Best Time:  any    Can we leave a detailed message on this number:  YES    Call taken on 3/20/2023 at 9:11 AM by Jaylyn Crain

## 2023-03-20 NOTE — PATIENT INSTRUCTIONS
Diagnosis: Allergic rhinitis with exacerbation and cough from a post nasal drip   Today we did:  Chest xray- no signs of pneumonia   Labs - showed an elevated eosinophils suggesting allergic reaction     Plan:   Histamine     Nasal spray    Benedryl - sedating   Avoidance of allergens   Prevention strategies   Follow up with allergy specialist - referral     Monitor for:   Respiratory status - potential asthma   Severe coughing, or coughing w/ blood production   Fever of 101 F  higher  Raised red bumps (hives)  Continuing symptoms, new symptoms, or worsening symptoms  Trouble breathing  Severe swelling of the face or severe itching of the eyes or mouth  Wheezing or shortness of breath  Chest tightness  Dizziness or lightheadedness  Feeling of doom  Stomach pain, bloating, vomiting, or diarrhea      Allergic Rhinitis  Allergic rhinitis is an allergic reaction that affects the nose, and often the eyes. It's often known as nasal allergies, seasonal allergies or environmental allergies.   Nasal allergies are often due to things in the environment that are breathed in. Depending what you are sensitive to, nasal allergies may occur only during certain seasons, or they may occur year round.   Common indoor allergens include:   house dust mites, mold, cockroaches, and pet dander.   Outdoor allergens include:  pollen from trees, grasses, and weeds.   Symptoms include a drippy, stuffy, and itchy nose.   They also include sneezing and red and itchy eyes.   You may feel tired more often.   Severe allergies may also affect your breathing and trigger a condition called asthma.   Tests can be done to see what allergens are affecting you. You may be referred to an allergy specialist for testing and further evaluation.

## 2023-03-20 NOTE — PROGRESS NOTES
URGENT CARE  Assessment & Plan   Assessment:   Gala Phan is a 57 year old female who's clinical presentation today is consistent with:   1. Allergic rhinitis, unspecified seasonality, unspecified trigger  - azelastine (ASTEPRO) 0.15 % nasal spray; Spray 2 sprays  - cetirizine (ZYRTEC) 10 MG tablet; Take 1 tablet (10 mg) by mouth daily    2. Bronchitis  - XR Chest 2 Views; Future  - CBC with platelets and differential  - predniSONE (DELTASONE) 10 MG tablet;  - fluticasone (FLOVENT HFA) 220 MCG/ACT inhaler;     No alarm signs or symptoms present   Differential Diagnoses for this patient's CC include    Bacterial vs viral etiology of URI    Covid, influenza, pharyngitis, pneumonia,   Common cold, allergic rhinitis, seasonal allergies    ABRS, viral sinusitis, cough, pertussis,   Mononucleosis, tonsillitis, chronic sinusitis, meningococcal disease    Plan:  (1) given patient's complaints of rhinorrhea, nasal congestion and post nasal drip causing a cough along with her elevated eosinophil count today, will treat patient at this time with second-generation oral antihistamine medication, and Nasal spray, and educated patient that oral antihistamines plus allergen avoidance is recommended. Discussed that if symptoms should progress, secondary treatment options that the patient was educated on today include: diphenhydramine, thought informed patient of their sedation side effects.   Further educated patient that should symptoms continue to be bothersome they would benefit from following up with their PCP or an allergy specialist for further evaluation and treatment. Educated that allergy testing can be done.   We also discussed respiratory status as well w/ regard to monitoring for potential asthma and worsening breathing conditions r/t seasonal/ environmental allergies  Discussed with patient to return for follow up if symptoms do not improve or if they worsen in the next 7-10 days}     Also given patient's  "chronicity of illness concern for progression to bronchitis and thus will start her on a steroid medication today as well.     Patient  is  agreeable to treatment plan and state they will follow-up if symptoms do not improve and/or if symptoms worsen (see patient's AVS 'monitor for' section for specific patient instructions given and discussed regarding what to watch for and when to follow up)    Medications ordered are listed above, please see AVS for patient's specific and personalized discharge instructions given     ZACHARY [facility]      ______________________________________________________________________        Subjective  Subjective     HPI: Gala Phan \"Faviola\"} is a 57 year old  female who presents today for evaluation the following concerns:   Pt presents for evaluation of clear  rhinorrhea,  Congestion, post nasal drip and cough for about two weeks, which started around 3/6/23   Patient has tried tessalon and an albuterol inhaler with no help or relief of her symptoms.    Patient denies: sleep disturbance, impairment of daily activities, asthma-like or progressive breathing symptoms or difficulty breathing.   Patient denies SOB. They deny any nasal burning, stinging, overt dryness, mucosal ulcerations, severe headaches.   Patient denies any chest pain      Allergies   Allergen Reactions     Atorvastatin Cramps     Pcn [Penicillins]      Tape [Adhesive Tape]      Latex Rash     Patient Active Problem List   Diagnosis     Anxiety state     Esophageal reflux     Breast screening     HYPERLIPIDEMIA LDL GOAL <130     Hypertension goal BP (blood pressure) < 140/90     Tobacco abuse       Review of Systems:  Pertinent review of systems as reflected in HPI, otherwise negative.     Objective  Objective    Physical Exam:  Vitals:    03/20/23 1447   BP: 114/81   Pulse: 109   Temp: 97.9  F (36.6  C)   TempSrc: Tympanic   SpO2: 96%   Weight: 83.9 kg (185 lb)    "   General: Alert and oriented, no acute distress, Vital signs reviewed: afebrile,  normotensive   Psy/mental status: Cooperative, nonanxious  SKIN: Intact, no rashes  EYES: EOMs intact, PERRLA bilaterally   Conjunctiva: slight injection present  EARS: TMs intact, translucent gray in color with normal landmarks present no erythema  or bulging tympanic membrane   Canals are without swelling, however have a mild amount of cerumen, no impaction  NOSE:  mucosa erythematous bilaterally with a moderate amount of rhinorrhea, clear discharge}               No frontal or maxillary sinus tenderness present bilaterally  MOUTH/THROAT: lips, tongue, & oral mucosa appear normal upon inspection                Posterior oropharynx is erythematous but without exudate, lesions or tonsillar  Edema, no dysphonia, no unilateral tonsillar edema, no uvular deviation,   no signs of peritonsillar abscess  NECK: supple, has full range of motion with no meningeal signs              No lymphadenopathy present  LUNG: normal work of breathing, good respiratory effort without retractions, good air  movement, non labored, inspection reveals normal chest expansion w/  inspiration            Lung sounds are clear to auscultation bilaterally,            No rales/rhonic/crackles wheezing noted           No cough noted     {LABS:   Results for orders placed or performed in visit on 03/20/23   XR Chest 2 Views     Status: None    Narrative    XR CHEST TWO VIEWS   3/20/2023 3:38 PM     HISTORY: Rule out pneumonia; Bronchitis    COMPARISON: 11/12/2009.      Impression    IMPRESSION: No acute cardiopulmonary disease.    JENIFER VAZQUEZ MD         SYSTEM ID:  [id number]   Results for orders placed or performed in visit on 03/20/23   CBC with platelets and differential     Status: Abnormal   Result Value Ref Range    WBC Count 11.8 (H) 4.0 - 11.0 10e3/uL    RBC Count 5.15 3.80 - 5.20 10e6/uL    Hemoglobin 15.3 11.7 - 15.7 g/dL    Hematocrit 45.5 35.0 - 47.0 %     MCV 88 78 - 100 fL    MCH 29.7 26.5 - 33.0 pg    MCHC 33.6 31.5 - 36.5 g/dL    RDW 12.9 10.0 - 15.0 %    Platelet Count 373 150 - 450 10e3/uL    % Neutrophils 61 %    % Lymphocytes 24 %    % Monocytes 8 %    % Eosinophils 7 %    % Basophils 1 %    % Immature Granulocytes 0 %    Absolute Neutrophils 7.2 1.6 - 8.3 10e3/uL    Absolute Lymphocytes 2.8 0.8 - 5.3 10e3/uL    Absolute Monocytes 0.9 0.0 - 1.3 10e3/uL    Absolute Eosinophils 0.8 (H) 0.0 - 0.7 10e3/uL    Absolute Basophils 0.1 0.0 - 0.2 10e3/uL    Absolute Immature Granulocytes 0.0 <=0.4 10e3/uL   CBC with platelets and differential     Status: Abnormal    Narrative    The following orders were created for panel order CBC with platelets and differential.  Procedure                               Abnormality         Status                     ---------                               -----------         ------                     CBC with platelets and d...[[id number]]  Abnormal            Final result                 Please view results for these tests on the individual orders.       Imaging:   All images were personally read by this provider (myself).   Per my independent interpretation the xray shows no signs of consolidation or infiltrates suggesting pneumonia     I explained my diagnostic considerations and recommendations to the patient, who voiced understanding and agreement with the treatment plan.   All questions were answered.   We discussed potential side effects, risks and benefits of any prescribed or recommended therapies, as well as expectations for response to treatments.  Please see AVS for any patient instructions & handouts given.   Patient was advised to contact the Nurse Care Line, their Primary Care provider, Urgent Care, or the Emergency Department if there are new or worsening symptoms, or call 911 for emergencies.        ______________________________________________________________________          Patient Instructions     Diagnosis:  Allergic rhinitis with exacerbation and cough from a post nasal drip   Today we did:  Chest xray- no signs of pneumonia   Labs - showed an elevated eosinophils suggesting allergic reaction     Plan:   1. Histamine     2. Nasal spray    3. Benedryl - sedating   4. Avoidance of allergens   o Prevention strategies   5. Follow up with allergy specialist - referral     Monitor for:     Respiratory status - potential asthma     Severe coughing, or coughing w/ blood production     Fever of 101 F  higher    Raised red bumps (hives)    Continuing symptoms, new symptoms, or worsening symptoms    Trouble breathing    Severe swelling of the face or severe itching of the eyes or mouth    Wheezing or shortness of breath    Chest tightness    Dizziness or lightheadedness    Feeling of doom    Stomach pain, bloating, vomiting, or diarrhea      Allergic Rhinitis  Allergic rhinitis is an allergic reaction that affects the nose, and often the eyes. It's often known as nasal allergies, seasonal allergies or environmental allergies.   Nasal allergies are often due to things in the environment that are breathed in. Depending what you are sensitive to, nasal allergies may occur only during certain seasons, or they may occur year round.   Common indoor allergens include:   house dust mites, mold, cockroaches, and pet dander.   Outdoor allergens include:  pollen from trees, grasses, and weeds.   Symptoms include a drippy, stuffy, and itchy nose.   They also include sneezing and red and itchy eyes.   You may feel tired more often.   Severe allergies may also affect your breathing and trigger a condition called asthma.   Tests can be done to see what allergens are affecting you. You may be referred to an allergy specialist for testing and further evaluation.

## 2023-03-20 NOTE — TELEPHONE ENCOUNTER
S-(situation): I talked with Faviola.  Using inhaler more than as prescribed.  Some wheezing.  No fever.  Has cough , clear phlegm.    B-(background): seen in the ER on 3/13/23. Treated with albuterol inhaler.    A-(assessment): still coughing and using inhaler more than as prescribed    R-(recommendations): she will schedule appointment  Selene Waldron RN

## 2023-03-20 NOTE — TELEPHONE ENCOUNTER
Patient Quality Outreach    Patient is due for the following:   Cervical Cancer Screening - PAP Needed    Next Steps:   Letter sent    Type of outreach:    Sent letter.      Questions for provider review:    None     Annalee Dallas CMA

## 2023-03-21 ENCOUNTER — TELEPHONE (OUTPATIENT)
Dept: FAMILY MEDICINE | Facility: CLINIC | Age: 58
End: 2023-03-21
Payer: COMMERCIAL

## 2023-03-21 NOTE — TELEPHONE ENCOUNTER
General Call      Reason for Call:  Per Fax from [facility] change request.  [facility] Tele [phone]  Fax [phone]    What are your questions or concerns:  Plan does not cover medication Prescribed . Per RX benefit plan alternative medications include:  Please fax back with approval along with strength, directions, quantity, and refills medication is on back order.     Date of last appointment with provider: 3/20/23 Moon [facility]

## 2023-03-29 ENCOUNTER — TELEPHONE (OUTPATIENT)
Dept: URGENT CARE | Facility: URGENT CARE | Age: 58
End: 2023-03-29

## 2023-03-29 NOTE — TELEPHONE ENCOUNTER
Faxed received from [facility]     Azelastine 0.15% Nasal is on back order. Can a new script be sent for Azelastine 0.1%. Please fax with approval, strength, directions , quantity and refills.

## 2023-05-30 DIAGNOSIS — I10 HYPERTENSION GOAL BP (BLOOD PRESSURE) < 140/90: ICD-10-CM

## 2023-05-30 DIAGNOSIS — E78.5 HYPERLIPIDEMIA LDL GOAL <130: ICD-10-CM

## 2023-05-30 DIAGNOSIS — F41.1 ANXIETY STATE: ICD-10-CM

## 2023-05-30 NOTE — TELEPHONE ENCOUNTER
Medication Question or Refill    Contacts       Type Contact Phone/Fax    05/30/2023 09:51 AM CDT Phone (Incoming) Faviola Phan (Self) [phone] (M)          What medication are you calling about (include dose and sig)?: amitriptyline (ELAVIL) ; pravastatin (PRAVACHOL) ; lisinopril (ZESTRIL)    Preferred Pharmacy:       [id number] - [place], [place] - [address] [place] [address] & [address]  [address] [address]  Phone: [phone] Fax: [phone]      Controlled Substance Agreement on file:   CSA -- Patient Level:    CSA: None found at the patient level.       Who prescribed the medication?: Dr. Maher    Do you need a refill? Yes    When did you use the medication last? today    Patient offered an appointment? Yes: 6/19/23    Do you have any questions or concerns?  Yes: Patient asked if she can get a refill to last her until she sees Roxi Nelson. She runs out in 8 days      Okay to leave a detailed message?: Yes at Cell number on file:    Telephone Information:   Mobile [phone]

## 2023-05-31 RX ORDER — PRAVASTATIN SODIUM 80 MG/1
TABLET ORAL
Qty: 90 TABLET | Refills: 0 | Status: SHIPPED | OUTPATIENT
Start: 2023-05-31 | End: 2023-06-19

## 2023-05-31 RX ORDER — LISINOPRIL 20 MG/1
20 TABLET ORAL DAILY
Qty: 90 TABLET | Refills: 0 | Status: SHIPPED | OUTPATIENT
Start: 2023-05-31 | End: 2023-06-19

## 2023-05-31 NOTE — TELEPHONE ENCOUNTER
Paula refill sent. Previous PCP was Dr Maher.   Last office visit: 6/6/2022 ; last virtual visit: 1/20/2022 with prescribing provider:     Future Office Visit:   Next 5 appointments (look out 90 days)    Jun 19, 2023  1:00 PM  (Arrive by 12:40 PM)  Provider Visit with ZACHARY Sexton CNP  [facility] ([facility] ) [address] [place] [id number]  [phone]         Jo Ann CORTEZ, RN

## 2023-05-31 NOTE — TELEPHONE ENCOUNTER
"Attempted to notify patient prescriptions sent in. \"The number you have called has calling restrictions preventing completion of this call\".   Jo Ann CORTEZ RN    "

## 2023-06-14 ENCOUNTER — DOCUMENTATION ONLY (OUTPATIENT)
Dept: LAB | Facility: CLINIC | Age: 58
End: 2023-06-14
Payer: COMMERCIAL

## 2023-06-14 NOTE — PROGRESS NOTES
Patient is coming for pre-visit labs for Roxi Nelson, patient has not been seen by Roxi before and provider may not want labs.  Please place future orders or contact patient.  Thanks!

## 2023-06-14 NOTE — PROGRESS NOTES
Please review labs pended. BMP OR CMP? Sign if ok, care team to notify patient if should cancel lab appt and wait for her clinic appt.   Jo Ann CORTEZ RN

## 2023-06-19 ENCOUNTER — OFFICE VISIT (OUTPATIENT)
Dept: FAMILY MEDICINE | Facility: CLINIC | Age: 58
End: 2023-06-19
Payer: COMMERCIAL

## 2023-06-19 ENCOUNTER — HOSPITAL ENCOUNTER (OUTPATIENT)
Dept: MAMMOGRAPHY | Facility: CLINIC | Age: 58
Discharge: HOME | End: 2023-06-19
Attending: NURSE PRACTITIONER | Admitting: NURSE PRACTITIONER
Payer: COMMERCIAL

## 2023-06-19 VITALS
DIASTOLIC BLOOD PRESSURE: 82 MMHG | SYSTOLIC BLOOD PRESSURE: 136 MMHG | HEART RATE: 102 BPM | WEIGHT: 184 LBS | OXYGEN SATURATION: 97 % | RESPIRATION RATE: 16 BRPM | HEIGHT: 66 IN | BODY MASS INDEX: 29.57 KG/M2 | TEMPERATURE: 97.8 F

## 2023-06-19 DIAGNOSIS — Z13.29 SCREENING FOR HYPOTHYROIDISM: ICD-10-CM

## 2023-06-19 DIAGNOSIS — Z12.4 CERVICAL CANCER SCREENING: ICD-10-CM

## 2023-06-19 DIAGNOSIS — F41.1 ANXIETY STATE: ICD-10-CM

## 2023-06-19 DIAGNOSIS — Z12.31 VISIT FOR SCREENING MAMMOGRAM: ICD-10-CM

## 2023-06-19 DIAGNOSIS — E78.5 HYPERLIPIDEMIA LDL GOAL <130: ICD-10-CM

## 2023-06-19 DIAGNOSIS — I10 HYPERTENSION GOAL BP (BLOOD PRESSURE) < 140/90: Primary | ICD-10-CM

## 2023-06-19 DIAGNOSIS — F51.01 PRIMARY INSOMNIA: ICD-10-CM

## 2023-06-19 DIAGNOSIS — Z12.11 SCREEN FOR COLON CANCER: ICD-10-CM

## 2023-06-19 LAB
ALBUMIN SERPL BCG-MCNC: 4.6 G/DL (ref 3.5–5.2)
ALP SERPL-CCNC: 93 U/L (ref 35–104)
ALT SERPL W P-5'-P-CCNC: 22 U/L (ref 0–50)
ANION GAP SERPL CALCULATED.3IONS-SCNC: 13 MMOL/L (ref 7–15)
AST SERPL W P-5'-P-CCNC: 22 U/L (ref 0–45)
BILIRUB SERPL-MCNC: 0.2 MG/DL
BUN SERPL-MCNC: 10 MG/DL (ref 6–20)
CALCIUM SERPL-MCNC: 10.1 MG/DL (ref 8.6–10)
CHLORIDE SERPL-SCNC: 103 MMOL/L (ref 98–107)
CHOLEST SERPL-MCNC: 215 MG/DL
CREAT SERPL-MCNC: 0.88 MG/DL (ref 0.51–0.95)
DEPRECATED HCO3 PLAS-SCNC: 24 MMOL/L (ref 22–29)
GFR SERPL CREATININE-BSD FRML MDRD: 76 ML/MIN/1.73M2
GLUCOSE SERPL-MCNC: 101 MG/DL (ref 70–99)
HDLC SERPL-MCNC: 37 MG/DL
LDLC SERPL CALC-MCNC: 121 MG/DL
NONHDLC SERPL-MCNC: 178 MG/DL
POTASSIUM SERPL-SCNC: 4.5 MMOL/L (ref 3.4–5.3)
PROT SERPL-MCNC: 7.8 G/DL (ref 6.4–8.3)
SODIUM SERPL-SCNC: 140 MMOL/L (ref 136–145)
TRIGL SERPL-MCNC: 285 MG/DL
TSH SERPL DL<=0.005 MIU/L-ACNC: 1.54 UIU/ML (ref 0.3–4.2)

## 2023-06-19 PROCEDURE — 84443 ASSAY THYROID STIM HORMONE: CPT | Performed by: NURSE PRACTITIONER

## 2023-06-19 PROCEDURE — 80053 COMPREHEN METABOLIC PANEL: CPT | Performed by: NURSE PRACTITIONER

## 2023-06-19 PROCEDURE — 77067 SCR MAMMO BI INCL CAD: CPT

## 2023-06-19 PROCEDURE — 99214 OFFICE O/P EST MOD 30 MIN: CPT | Performed by: NURSE PRACTITIONER

## 2023-06-19 PROCEDURE — 36415 COLL VENOUS BLD VENIPUNCTURE: CPT | Performed by: NURSE PRACTITIONER

## 2023-06-19 PROCEDURE — 80061 LIPID PANEL: CPT | Performed by: NURSE PRACTITIONER

## 2023-06-19 RX ORDER — PRAVASTATIN SODIUM 80 MG/1
TABLET ORAL
Qty: 90 TABLET | Refills: 1 | Status: SHIPPED | OUTPATIENT
Start: 2023-06-19 | End: 2024-05-29

## 2023-06-19 RX ORDER — TRAZODONE HYDROCHLORIDE 50 MG/1
50 TABLET, FILM COATED ORAL
Qty: 60 TABLET | Refills: 1 | Status: SHIPPED | OUTPATIENT
Start: 2023-06-19 | End: 2024-06-11

## 2023-06-19 RX ORDER — LISINOPRIL 20 MG/1
20 TABLET ORAL DAILY
Qty: 90 TABLET | Refills: 1 | Status: SHIPPED | OUTPATIENT
Start: 2023-06-19 | End: 2024-02-23

## 2023-06-19 ASSESSMENT — ANXIETY QUESTIONNAIRES
7. FEELING AFRAID AS IF SOMETHING AWFUL MIGHT HAPPEN: NOT AT ALL
2. NOT BEING ABLE TO STOP OR CONTROL WORRYING: NOT AT ALL
8. IF YOU CHECKED OFF ANY PROBLEMS, HOW DIFFICULT HAVE THESE MADE IT FOR YOU TO DO YOUR WORK, TAKE CARE OF THINGS AT HOME, OR GET ALONG WITH OTHER PEOPLE?: NOT DIFFICULT AT ALL
3. WORRYING TOO MUCH ABOUT DIFFERENT THINGS: NOT AT ALL
5. BEING SO RESTLESS THAT IT IS HARD TO SIT STILL: NOT AT ALL
GAD7 TOTAL SCORE: 0
IF YOU CHECKED OFF ANY PROBLEMS ON THIS QUESTIONNAIRE, HOW DIFFICULT HAVE THESE PROBLEMS MADE IT FOR YOU TO DO YOUR WORK, TAKE CARE OF THINGS AT HOME, OR GET ALONG WITH OTHER PEOPLE: NOT DIFFICULT AT ALL
GAD7 TOTAL SCORE: 0
1. FEELING NERVOUS, ANXIOUS, OR ON EDGE: NOT AT ALL
6. BECOMING EASILY ANNOYED OR IRRITABLE: NOT AT ALL
7. FEELING AFRAID AS IF SOMETHING AWFUL MIGHT HAPPEN: NOT AT ALL
4. TROUBLE RELAXING: NOT AT ALL
GAD7 TOTAL SCORE: 0

## 2023-06-19 ASSESSMENT — PAIN SCALES - GENERAL: PAINLEVEL: NO PAIN (0)

## 2023-06-19 ASSESSMENT — ENCOUNTER SYMPTOMS: NERVOUS/ANXIOUS: 1

## 2023-06-19 ASSESSMENT — PATIENT HEALTH QUESTIONNAIRE - PHQ9
SUM OF ALL RESPONSES TO PHQ QUESTIONS 1-9: 2
SUM OF ALL RESPONSES TO PHQ QUESTIONS 1-9: 2
10. IF YOU CHECKED OFF ANY PROBLEMS, HOW DIFFICULT HAVE THESE PROBLEMS MADE IT FOR YOU TO DO YOUR WORK, TAKE CARE OF THINGS AT HOME, OR GET ALONG WITH OTHER PEOPLE: NOT DIFFICULT AT ALL

## 2023-06-19 NOTE — PROGRESS NOTES
"  Assessment & Plan     ICD-10-CM    1. Hypertension goal BP (blood pressure) < 140/90  I10 REVIEW OF HEALTH MAINTENANCE PROTOCOL ORDERS     Comprehensive metabolic panel (BMP + Alb, Alk Phos, ALT, AST, Total. Bili, TP)     lisinopril (ZESTRIL) 20 MG tablet     Comprehensive metabolic panel (BMP + Alb, Alk Phos, ALT, AST, Total. Bili, TP)      2. Hyperlipidemia LDL goal <130  E78.5 Lipid panel reflex to direct LDL Fasting     Comprehensive metabolic panel (BMP + Alb, Alk Phos, ALT, AST, Total. Bili, TP)     pravastatin (PRAVACHOL) 80 MG tablet     Comprehensive metabolic panel (BMP + Alb, Alk Phos, ALT, AST, Total. Bili, TP)     Lipid panel reflex to direct LDL Fasting      3. Primary insomnia  F51.01 traZODone (DESYREL) 50 MG tablet      4. Cervical cancer screening  Z12.4       5. Screen for colon cancer  Z12.11 COLOGUARD([employer])     CANCELED: Fecal colorectal cancer screen FIT - Future (S+30)      6. Anxiety state  F41.1 amitriptyline (ELAVIL) 25 MG tablet      7. Screening for hypothyroidism  Z13.29 TSH with free T4 reflex     TSH with free T4 reflex           Nicotine/Tobacco Cessation:  She reports that she has been smoking cigarettes. She has a 18.50 pack-year smoking history. She has never used smokeless tobacco.  Nicotine/Tobacco Cessation Plan:   Information offered: Patient not interested at this time      BMI:   Estimated body mass index is 29.7 kg/m  as calculated from the following:    Height as of this encounter: 1.676 m (5' 6\").    Weight as of this encounter: 83.5 kg (184 lb).   Weight management plan: Discussed healthy diet and exercise guidelines    See Patient Instructions    ZACHARY Sexton CNP  [facility]    Mukund Salmeron is a 57 year old, presenting for the following health issues:  Establish Care, Hypertension, Lipids, and Anxiety        6/19/2023    12:47 PM   Additional Questions   Roomed by SMA Catarina     Anxiety    History of Present " Illness       Reason for visit:  Refill    She eats 0-1 servings of fruits and vegetables daily.She consumes 1 sweetened beverage(s) daily.She exercises with enough effort to increase her heart rate 9 or less minutes per day.  She exercises with enough effort to increase her heart rate 3 or less days per week.   She is taking medications regularly.    Today's PHQ-9         PHQ-9 Total Score: 2    PHQ-9 Q9 Thoughts of better off dead/self-harm past 2 weeks :   Not at all    How difficult have these problems made it for you to do your work, take care of things at home, or get along with other people: Not difficult at all  Today's ALISHA-7 Score: 0         Hyperlipidemia Follow-Up      Are you regularly taking any medication or supplement to lower your cholesterol?   Yes- Pravastatin    Are you having muscle aches or other side effects that you think could be caused by your cholesterol lowering medication?  No    Hypertension Follow-up      Do you check your blood pressure regularly outside of the clinic? No     Are you following a low salt diet? Yes    Are your blood pressures ever more than 140 on the top number (systolic) OR more   than 90 on the bottom number (diastolic), for example 140/90? No    BP Readings from Last 2 Encounters:   06/19/23 136/82   03/20/23 114/81     LDL Cholesterol Calculated (mg/dL)   Date Value   06/01/2022 115 (H)   06/04/2021 113 (H)   04/03/2019 117 (H)       Anxiety Follow-Up    How are you doing with your anxiety since your last visit? No change    Are you having other symptoms that might be associated with anxiety? Yes:  Getting and staying asleep    Have you had a significant life event? No     Are you feeling depressed? No    Do you have any concerns with your use of alcohol or other drugs? No    Social History     Tobacco Use     Smoking status: Every Day     Packs/day: 0.50     Years: 37.00     Pack years: 18.50     Types: Cigarettes     Smokeless tobacco: Never     Tobacco comments:  "    started at age 19.  2/12/2010:smoking 1/4-1/2 ppd.    Vaping Use     Vaping status: Never Used   Substance Use Topics     Alcohol use: No     Drug use: No         3/25/2020    10:38 AM 6/1/2022     2:23 PM 6/19/2023    12:37 PM   ALISHA-7 SCORE   Total Score   0 (minimal anxiety)   Total Score 0 0 0         3/25/2020    10:38 AM 6/1/2022     2:23 PM 6/19/2023    12:36 PM   PHQ   PHQ-9 Total Score 0 1 2   Q9: Thoughts of better off dead/self-harm past 2 weeks Not at all Not at all Not at all         6/19/2023    12:36 PM   Last PHQ-9   1.  Little interest or pleasure in doing things 0   2.  Feeling down, depressed, or hopeless 0   3.  Trouble falling or staying asleep, or sleeping too much 1   4.  Feeling tired or having little energy 1   5.  Poor appetite or overeating 0   6.  Feeling bad about yourself 0   7.  Trouble concentrating 0   8.  Moving slowly or restless 0   Q9: Thoughts of better off dead/self-harm past 2 weeks 0   PHQ-9 Total Score 2         6/19/2023    12:37 PM   ALISHA-7    1. Feeling nervous, anxious, or on edge 0   2. Not being able to stop or control worrying 0   3. Worrying too much about different things 0   4. Trouble relaxing 0   5. Being so restless that it is hard to sit still 0   6. Becoming easily annoyed or irritable 0   7. Feeling afraid, as if something awful might happen 0   ALISHA-7 Total Score 0   If you checked any problems, how difficult have they made it for you to do your work, take care of things at home, or get along with other people? Not difficult at all         Review of Systems   Psychiatric/Behavioral: The patient is nervous/anxious.       Constitutional, HEENT, cardiovascular, pulmonary, gi and gu systems are negative, except as otherwise noted.      Objective    /82 (BP Location: Right arm, Patient Position: Sitting, Cuff Size: Adult Large)   Pulse 102   Temp 97.8  F (36.6  C) (Tympanic)   Resp 16   Ht 1.676 m (5' 6\")   Wt 83.5 kg (184 lb)   LMP 11/04/2009   " SpO2 97%   BMI 29.70 kg/m    Body mass index is 29.7 kg/m .  Physical Exam   GENERAL: healthy, alert and no distress  EYES: Eyes grossly normal to inspection, PERRL and conjunctivae and sclerae normal  HENT: ear canals and TM's normal, nose and mouth without ulcers or lesions  NECK: no adenopathy, no asymmetry, masses, or scars and thyroid normal to palpation  RESP: lungs clear to auscultation - no rales, rhonchi or wheezes  CV: regular rate and rhythm, normal S1 S2, no S3 or S4, no murmur, click or rub, no peripheral edema and peripheral pulses strong  ABDOMEN: soft, nontender, no hepatosplenomegaly, no masses and bowel sounds normal  MS: no gross musculoskeletal defects noted, no edema  SKIN: no suspicious lesions or rashes  NEURO: Normal strength and tone, mentation intact and speech normal  PSYCH: mentation appears normal, affect normal/bright    Results for orders placed or performed during the hospital encounter of 06/19/23   MA Screen Bilateral w/Andrei     Status: None    Narrative    BILATERAL FULL FIELD DIGITAL SCREENING MAMMOGRAM WITH TOMOSYNTHESIS    Performed on: 6/19/23    Compared to: 05/18/2022, 04/09/2019, 10/12/2016, and 10/06/2014    Technique:  This study was evaluated with the assistance of Computer-Aided   Detection.  Breast Tomosynthesis was used in interpretation.    Findings: The breasts are heterogeneously dense, which may obscure small   masses.  There is no radiographic evidence of malignancy.     Impression    IMPRESSION: ACR BI-RADS Category 1: Negative    RECOMMENDED FOLLOW-UP: Annual routine screening mammogram    The results and recommendations of this examination will be communicated   to the patient.        Shahbaz Owusu MD

## 2023-06-19 NOTE — LETTER
June 22, 2023      Faviola Phan  [address] [facility] [id number]        Dear ,    We are writing to inform you of your test results.  Thyroid was within normal limits comprehensive panel was within normal limits and cholesterol numbers are mildly improved with a total cholesterol at 215 like that below 200 continue with current cholesterol medication and low-cholesterol diet and exercise.       Resulted Orders   TSH with free T4 reflex   Result Value Ref Range    TSH 1.54 0.30 - 4.20 uIU/mL   Comprehensive metabolic panel (BMP + Alb, Alk Phos, ALT, AST, Total. Bili, TP)   Result Value Ref Range    Sodium 140 136 - 145 mmol/L    Potassium 4.5 3.4 - 5.3 mmol/L    Chloride 103 98 - 107 mmol/L    Carbon Dioxide (CO2) 24 22 - 29 mmol/L    Anion Gap 13 7 - 15 mmol/L    Urea Nitrogen 10.0 6.0 - 20.0 mg/dL    Creatinine 0.88 0.51 - 0.95 mg/dL    Calcium 10.1 (H) 8.6 - 10.0 mg/dL    Glucose 101 (H) 70 - 99 mg/dL    Alkaline Phosphatase 93 35 - 104 U/L    AST 22 0 - 45 U/L      Comment:      Reference intervals for this test were updated on 6/12/2023 to more accurately reflect our healthy population. There may be differences in the flagging of prior results with similar values performed with this method. Interpretation of those prior results can be made in the context of the updated reference intervals.    ALT 22 0 - 50 U/L      Comment:      Reference intervals for this test were updated on 6/12/2023 to more accurately reflect our healthy population. There may be differences in the flagging of prior results with similar values performed with this method. Interpretation of those prior results can be made in the context of the updated reference intervals.      Protein Total 7.8 6.4 - 8.3 g/dL    Albumin 4.6 3.5 - 5.2 g/dL    Bilirubin Total 0.2 <=1.2 mg/dL    GFR Estimate 76 >60 mL/min/1.73m2      Comment:      eGFR calculated using 2021 CKD-EPI equation.   Lipid panel reflex to direct LDL Fasting    Result Value Ref Range    Cholesterol 215 (H) <200 mg/dL    Triglycerides 285 (H) <150 mg/dL    Direct Measure HDL 37 (L) >=50 mg/dL    LDL Cholesterol Calculated 121 (H) <=100 mg/dL    Non HDL Cholesterol 178 (H) <130 mg/dL    Narrative    Cholesterol  Desirable:  <200 mg/dL    Triglycerides  Normal:  Less than 150 mg/dL  Borderline High:  150-199 mg/dL  High:  200-499 mg/dL  Very High:  Greater than or equal to 500 mg/dL    Direct Measure HDL  Female:  Greater than or equal to 50 mg/dL   Male:  Greater than or equal to 40 mg/dL    LDL Cholesterol  Desirable:  <100mg/dL  Above Desirable:  100-129 mg/dL   Borderline High:  130-159 mg/dL   High:  160-189 mg/dL   Very High:  >= 190 mg/dL    Non HDL Cholesterol  Desirable:  130 mg/dL  Above Desirable:  130-159 mg/dL  Borderline High:  160-189 mg/dL  High:  190-219 mg/dL  Very High:  Greater than or equal to 220 mg/dL     Lab Results   Component Value Date    CHOL 215 06/19/2023    CHOL 209 06/01/2022    CHOL 202 06/04/2021    CHOL 206 04/03/2019     Lab Results   Component Value Date    HDL 37 06/19/2023    HDL 45 06/01/2022    HDL 43 06/04/2021    HDL 38 04/03/2019     Lab Results   Component Value Date     06/19/2023     06/01/2022     06/04/2021     04/03/2019     Lab Results   Component Value Date    TRIG 285 06/19/2023    TRIG 244 06/01/2022    TRIG 231 06/04/2021    TRIG 255 04/03/2019     Lab Results   Component Value Date    CHOLHDLRATIO 5.2 02/09/2015    CHOLHDLRATIO 6.0 01/22/2014        If you have any questions or concerns, please call the clinic at the number listed above.       Sincerely,      Roxi Nelson, ZACHARY CNP/dw

## 2023-06-20 ENCOUNTER — LAB (OUTPATIENT)
Dept: FAMILY MEDICINE | Facility: CLINIC | Age: 58
End: 2023-06-20
Payer: COMMERCIAL

## 2023-06-20 DIAGNOSIS — Z12.11 SCREEN FOR COLON CANCER: ICD-10-CM

## 2023-07-19 LAB — NONINV COLON CA DNA+OCC BLD SCRN STL QL: NEGATIVE

## 2024-02-23 DIAGNOSIS — F41.1 ANXIETY STATE: ICD-10-CM

## 2024-02-23 DIAGNOSIS — I10 HYPERTENSION GOAL BP (BLOOD PRESSURE) < 140/90: ICD-10-CM

## 2024-02-23 RX ORDER — LISINOPRIL 20 MG/1
20 TABLET ORAL DAILY
Qty: 90 TABLET | Refills: 0 | Status: SHIPPED | OUTPATIENT
Start: 2024-02-23 | End: 2024-05-01

## 2024-02-23 NOTE — TELEPHONE ENCOUNTER
Routing refill request to provider for review/approval because:  Drug interaction warning    Requested Prescriptions   Pending Prescriptions Disp Refills    amitriptyline (ELAVIL) 25 MG tablet [Pharmacy Med Name: AMITRIPTYLINE 25MG TABLETS] 90 tablet 1     Sig: TAKE ONE TABLET BY MOUTH EVERY NIGHT AT BEDTIME FOR PREVENTING ANXIETY       Tricyclic Agents ( Annual appt and no PHQ9) Passed - 2/23/2024  8:42 AM        Passed - Blood Pressure under 140/90 in past 12 mos     BP Readings from Last 3 Encounters:   06/19/23 136/82   03/20/23 114/81 03/13/23 (!) 143/89                 Passed - Recent (12 mo) or future (30 days) visit within authorizing provider's specialty     The patient must have completed an in-person or virtual visit within the past 12 months or has a future visit scheduled within the next 90 days with the authorizing provider s specialty.  Urgent care and e-visits do not quality as an office visit for this protocol.          Passed - Medication is active on med list        Passed - Patient is age 18 or older        Passed - Patient is not pregnant        Passed - No positive pregnancy test on record in past 12 mos          lisinopril (ZESTRIL) 20 MG tablet [Pharmacy Med Name: LISINOPRIL 20MG TABLETS] 90 tablet 1     Sig: TAKE 1 TABLET(20 MG) BY MOUTH DAILY       ACE Inhibitors (Including Combos) Protocol Passed - 2/23/2024  8:42 AM        Passed - Blood pressure under 140/90 in past 12 months     BP Readings from Last 3 Encounters:   06/19/23 136/82   03/20/23 114/81 03/13/23 (!) 143/89                 Passed - Medication is active on med list        Passed - Medication indicated for associated diagnosis     Medication is associated with one or more of the following diagnoses:     Chronic Kidney Disease (CKD)   Coronary Artery Disease (CAD)   Diabetes   Heart Failure (HF)   Hypertension (HTN)   Nephropathy            Passed - Has GFR on file in past 12 months and most recent value is normal         Passed - Recent (12 mo) or future (90 days) visit within the authorizing provider's specialty     The patient must have completed an in-person or virtual visit within the past 12 months or has a future visit scheduled within the next 90 days with the authorizing provider s specialty.  Urgent care and e-visits do not quality as an office visit for this protocol.          Passed - Patient is age 18 or older        Passed - No active pregnancy on record        Passed - Normal serum creatinine on file in past 12 months     Recent Labs   Lab Test 06/19/23  1322   CR 0.88       Ok to refill medication if creatinine is low          Passed - Normal serum potassium on file in past 12 months     Recent Labs   Lab Test 06/19/23  1322   POTASSIUM 4.5             Passed - No positive pregnancy test within past 12 months

## 2024-05-29 DIAGNOSIS — E78.5 HYPERLIPIDEMIA LDL GOAL <130: ICD-10-CM

## 2024-05-29 RX ORDER — PRAVASTATIN SODIUM 80 MG/1
TABLET ORAL
Qty: 90 TABLET | Refills: 0 | Status: SHIPPED | OUTPATIENT
Start: 2024-05-29 | End: 2024-06-11

## 2024-05-29 NOTE — TELEPHONE ENCOUNTER
Paula refill given x 1, patient has appointment 6/11/24 with Roxi Nelson NP.   Prescription approved per [facility] Refill Protocol.  Julie Behrendt RN

## 2024-06-11 ENCOUNTER — OFFICE VISIT (OUTPATIENT)
Dept: FAMILY MEDICINE | Facility: CLINIC | Age: 59
End: 2024-06-11
Payer: COMMERCIAL

## 2024-06-11 DIAGNOSIS — Z12.4 CERVICAL CANCER SCREENING: ICD-10-CM

## 2024-06-11 DIAGNOSIS — E78.5 HYPERLIPIDEMIA LDL GOAL <130: ICD-10-CM

## 2024-06-11 DIAGNOSIS — K21.00 GASTROESOPHAGEAL REFLUX DISEASE WITH ESOPHAGITIS WITHOUT HEMORRHAGE: ICD-10-CM

## 2024-06-11 DIAGNOSIS — Z00.00 ROUTINE GENERAL MEDICAL EXAMINATION AT A HEALTH CARE FACILITY: Primary | ICD-10-CM

## 2024-06-11 DIAGNOSIS — F41.1 ANXIETY STATE: ICD-10-CM

## 2024-06-11 DIAGNOSIS — I10 HYPERTENSION GOAL BP (BLOOD PRESSURE) < 140/90: ICD-10-CM

## 2024-06-11 LAB
ALBUMIN SERPL BCG-MCNC: 4.3 G/DL (ref 3.5–5.2)
ALP SERPL-CCNC: 88 U/L (ref 40–150)
ALT SERPL W P-5'-P-CCNC: 22 U/L (ref 0–50)
ANION GAP SERPL CALCULATED.3IONS-SCNC: 12 MMOL/L (ref 7–15)
AST SERPL W P-5'-P-CCNC: 20 U/L (ref 0–45)
BILIRUB SERPL-MCNC: 0.2 MG/DL
BUN SERPL-MCNC: 13.5 MG/DL (ref 8–23)
CALCIUM SERPL-MCNC: 9.7 MG/DL (ref 8.6–10)
CHLORIDE SERPL-SCNC: 105 MMOL/L (ref 98–107)
CHOLEST SERPL-MCNC: 206 MG/DL
CREAT SERPL-MCNC: 0.93 MG/DL (ref 0.51–0.95)
DEPRECATED HCO3 PLAS-SCNC: 22 MMOL/L (ref 22–29)
EGFRCR SERPLBLD CKD-EPI 2021: 70 ML/MIN/1.73M2
ERYTHROCYTE [DISTWIDTH] IN BLOOD BY AUTOMATED COUNT: 12.9 % (ref 10–15)
FASTING STATUS PATIENT QL REPORTED: YES
FASTING STATUS PATIENT QL REPORTED: YES
GLUCOSE SERPL-MCNC: 104 MG/DL (ref 70–99)
HCT VFR BLD AUTO: 43.6 % (ref 35–47)
HDLC SERPL-MCNC: 39 MG/DL
HGB BLD-MCNC: 14.4 G/DL (ref 11.7–15.7)
LDLC SERPL CALC-MCNC: 139 MG/DL
MCH RBC QN AUTO: 29.6 PG (ref 26.5–33)
MCHC RBC AUTO-ENTMCNC: 33 G/DL (ref 31.5–36.5)
MCV RBC AUTO: 90 FL (ref 78–100)
NONHDLC SERPL-MCNC: 167 MG/DL
PLATELET # BLD AUTO: 317 10E3/UL (ref 150–450)
POTASSIUM SERPL-SCNC: 4.6 MMOL/L (ref 3.4–5.3)
PROT SERPL-MCNC: 7.3 G/DL (ref 6.4–8.3)
RBC # BLD AUTO: 4.86 10E6/UL (ref 3.8–5.2)
SODIUM SERPL-SCNC: 139 MMOL/L (ref 135–145)
TRIGL SERPL-MCNC: 141 MG/DL
WBC # BLD AUTO: 8.4 10E3/UL (ref 4–11)

## 2024-06-11 PROCEDURE — 36415 COLL VENOUS BLD VENIPUNCTURE: CPT | Performed by: NURSE PRACTITIONER

## 2024-06-11 PROCEDURE — 99214 OFFICE O/P EST MOD 30 MIN: CPT | Mod: 25 | Performed by: NURSE PRACTITIONER

## 2024-06-11 PROCEDURE — 80053 COMPREHEN METABOLIC PANEL: CPT | Performed by: NURSE PRACTITIONER

## 2024-06-11 PROCEDURE — 90471 IMMUNIZATION ADMIN: CPT | Performed by: NURSE PRACTITIONER

## 2024-06-11 PROCEDURE — 85027 COMPLETE CBC AUTOMATED: CPT | Performed by: NURSE PRACTITIONER

## 2024-06-11 PROCEDURE — 80061 LIPID PANEL: CPT | Performed by: NURSE PRACTITIONER

## 2024-06-11 PROCEDURE — 99396 PREV VISIT EST AGE 40-64: CPT | Mod: 25 | Performed by: NURSE PRACTITIONER

## 2024-06-11 PROCEDURE — 90677 PCV20 VACCINE IM: CPT | Performed by: NURSE PRACTITIONER

## 2024-06-11 RX ORDER — LISINOPRIL 20 MG/1
20 TABLET ORAL DAILY
Qty: 90 TABLET | Refills: 3 | Status: SHIPPED | OUTPATIENT
Start: 2024-06-11

## 2024-06-11 RX ORDER — PRAVASTATIN SODIUM 80 MG/1
TABLET ORAL
Qty: 90 TABLET | Refills: 3 | Status: SHIPPED | OUTPATIENT
Start: 2024-06-11

## 2024-06-11 RX ORDER — FAMOTIDINE 20 MG/1
20 TABLET, FILM COATED ORAL 2 TIMES DAILY PRN
Qty: 90 TABLET | Refills: 3 | Status: SHIPPED | OUTPATIENT
Start: 2024-06-11

## 2024-06-11 SDOH — HEALTH STABILITY: PHYSICAL HEALTH: ON AVERAGE, HOW MANY MINUTES DO YOU ENGAGE IN EXERCISE AT THIS LEVEL?: 40 MIN

## 2024-06-11 SDOH — HEALTH STABILITY: PHYSICAL HEALTH: ON AVERAGE, HOW MANY DAYS PER WEEK DO YOU ENGAGE IN MODERATE TO STRENUOUS EXERCISE (LIKE A BRISK WALK)?: 2 DAYS

## 2024-06-11 ASSESSMENT — ANXIETY QUESTIONNAIRES
2. NOT BEING ABLE TO STOP OR CONTROL WORRYING: NOT AT ALL
7. FEELING AFRAID AS IF SOMETHING AWFUL MIGHT HAPPEN: NOT AT ALL
1. FEELING NERVOUS, ANXIOUS, OR ON EDGE: NOT AT ALL
3. WORRYING TOO MUCH ABOUT DIFFERENT THINGS: NOT AT ALL
5. BEING SO RESTLESS THAT IT IS HARD TO SIT STILL: SEVERAL DAYS
GAD7 TOTAL SCORE: 1
6. BECOMING EASILY ANNOYED OR IRRITABLE: NOT AT ALL
GAD7 TOTAL SCORE: 1
IF YOU CHECKED OFF ANY PROBLEMS ON THIS QUESTIONNAIRE, HOW DIFFICULT HAVE THESE PROBLEMS MADE IT FOR YOU TO DO YOUR WORK, TAKE CARE OF THINGS AT HOME, OR GET ALONG WITH OTHER PEOPLE: NOT DIFFICULT AT ALL

## 2024-06-11 ASSESSMENT — PAIN SCALES - GENERAL: PAINLEVEL: MILD PAIN (2)

## 2024-06-11 ASSESSMENT — PATIENT HEALTH QUESTIONNAIRE - PHQ9: 5. POOR APPETITE OR OVEREATING: NOT AT ALL

## 2024-06-11 ASSESSMENT — SOCIAL DETERMINANTS OF HEALTH (SDOH): HOW OFTEN DO YOU GET TOGETHER WITH FRIENDS OR RELATIVES?: MORE THAN THREE TIMES A WEEK

## 2024-06-11 NOTE — PROGRESS NOTES
"Preventive Care Visit  [facility]  ZACHARY Sexton CNP, Family Medicine  Jun 11, 2024      Assessment & Plan     Routine general medical examination at a health care facility  Declined  lung cancer screening   - Comprehensive metabolic panel (BMP + Alb, Alk Phos, ALT, AST, Total. Bili, TP); Future  - CBC with platelets; Future    Hyperlipidemia LDL goal <130  Stable   - Lipid panel reflex to direct LDL Non-fasting; Future  - pravastatin (PRAVACHOL) 80 MG tablet; TAKE 1 TABLET(80 MG) BY MOUTH DAILY    Anxiety state  Stable   - amitriptyline (ELAVIL) 25 MG tablet; TAKE ONE TABLET BY MOUTH EVERY NIGHT AT BEDTIME FOR PREVENTING ANXIETY    Hypertension goal BP (blood pressure) < 140/90   Controlled no change in treatment plan   - lisinopril (ZESTRIL) 20 MG tablet; Take 1 tablet (20 mg) by mouth daily    Gastroesophageal reflux disease with esophagitis without hemorrhage  Uncontrolled will start on Pepcid   - famotidine (PEPCID) 20 MG tablet; Take 1 tablet (20 mg) by mouth 2 times daily as needed (gerd)    Cervical cancer screening  Patient declined reviewed the importance of cervical cancer screening.    Patient has been advised of split billing requirements and indicates understanding: Yes        Nicotine/Tobacco Cessation  She reports that she has been smoking cigarettes. She has a 18.5 pack-year smoking history. She has never used smokeless tobacco.  Nicotine/Tobacco Cessation Plan  Information offered: Patient not interested at this time      BMI  Estimated body mass index is 29.39 kg/m  as calculated from the following:    Height as of this encounter: 1.676 m (5' 5.98\").    Weight as of this encounter: 82.6 kg (182 lb).   Weight management plan: Discussed healthy diet and exercise guidelines    Counseling  Appropriate preventive services were discussed with this patient, including applicable screening as appropriate for fall prevention, nutrition, physical activity, Tobacco-use " cessation, weight loss and cognition.  Checklist reviewing preventive services available has been given to the patient.  Reviewed patient's diet, addressing concerns and/or questions.   She is at risk for lack of exercise and has been provided with information to increase physical activity for the benefit of her well-being.   The patient was instructed to see the dentist every 6 months.   She is at risk for psychosocial distress and has been provided with information to reduce risk.       See Patient Instructions    Mukund Salmeron is a 58 year old, presenting for the following:  Hypertension, Lipids, Gastrophageal Reflux, and Physical        6/11/2024     8:00 AM   Additional Questions   Roomed by oziel schumacher cma        Health Care Directive  Patient does not have a Health Care Directive or Living Will: Discussed advance care planning with patient; information given to patient to review.    Healthy Habits:     Taking medications regularly:  0  History of Present Illness       Reason for visit:  Yearly    She eats 2-3 servings of fruits and vegetables daily.She consumes 1 sweetened beverage(s) daily.She exercises with enough effort to increase her heart rate 9 or less minutes per day.  She exercises with enough effort to increase her heart rate 3 or less days per week.   She is taking medications regularly.    Hyperlipidemia Follow-Up    Are you regularly taking any medication or supplement to lower your cholesterol?   Yes-   Are you having muscle aches or other side effects that you think could be caused by your cholesterol lowering medication?  No    Hypertension Follow-up    Do you check your blood pressure regularly outside of the clinic? No   Are you following a low salt diet? Yes  Are your blood pressures ever more than 140 on the top number (systolic) OR more   than 90 on the bottom number (diastolic), for example 140/90? No    Anxiety   How are you doing with your anxiety since your last visit? Improved   Are  you having other symptoms that might be associated with anxiety? Unsure maybe heartburn   Have you had a significant life event? Relationship Concerns   Are you feeling depressed? No  Do you have any concerns with your use of alcohol or other drugs? No    Social History     Tobacco Use    Smoking status: Every Day     Current packs/day: 0.50     Average packs/day: 0.5 packs/day for 37.0 years (18.5 ttl pk-yrs)     Types: Cigarettes    Smokeless tobacco: Never    Tobacco comments:     started at age 19.  2/12/2010:smoking 1/4-1/2 ppd.    Vaping Use    Vaping status: Never Used   Substance Use Topics    Alcohol use: No    Drug use: No         3/25/2020    10:38 AM 6/1/2022     2:23 PM 6/19/2023    12:37 PM   ALISHA-7 SCORE   Total Score   0 (minimal anxiety)   Total Score 0 0 0         3/25/2020    10:38 AM 6/1/2022     2:23 PM 6/19/2023    12:36 PM   PHQ   PHQ-9 Total Score 0 1 2   Q9: Thoughts of better off dead/self-harm past 2 weeks Not at all Not at all Not at all         6/11/2024     8:06 AM   ALISHA-7    1. Feeling nervous, anxious, or on edge 0   2. Not being able to stop or control worrying 0   3. Worrying too much about different things 0   4. Trouble relaxing 0   5. Being so restless that it is hard to sit still 1   6. Becoming easily annoyed or irritable 0   7. Feeling afraid, as if something awful might happen 0   ALISHA-7 Total Score 1   If you checked any problems, how difficult have they made it for you to do your work, take care of things at home, or get along with other people? Not difficult at all       GERD/Heartburn  Onset/Duration: years ago   Description: acid in throat   Intensity: severe  Progression of Symptoms: intermittent  Accompanying Signs & Symptoms:  Does it feel like food gets stuck or trouble swallowing: No  Nausea: YES  Vomiting (bloody?): YES  Abdominal Pain: YES  Black-Tarry stools: No  Bloody stools: No  History:  Previous similar episodes: YES  Previous ulcers: No  Precipitating  factors:   Caffeine use: YES  Alcohol use: No  NSAID/Aspirin use: No  Tobacco use: YES  Worse with unsure .        Today's PHQ-2 Score:       6/11/2024     8:13 AM   PHQ-2 ( 1999 Pf[employer])   Q1: Little interest or pleasure in doing things 0   Q2: Feeling down, depressed or hopeless 0   PHQ-2 Score 0   Q1: Little interest or pleasure in doing things Not at all   Q2: Feeling down, depressed or hopeless Not at all   PHQ-2 Score 0           6/19/2023   Substance Use   If I could quit smoking, I would Somewhat agree   I want to quit somking, worry about health affects Somewhat agree   Willing to make a plan to quit smoking Somewhat agree   Willing to cut down before quitting Completely agree     Social History     Tobacco Use    Smoking status: Every Day     Current packs/day: 0.50     Average packs/day: 0.5 packs/day for 37.0 years (18.5 ttl pk-yrs)     Types: Cigarettes    Smokeless tobacco: Never    Tobacco comments:     started at age 19.  2/12/2010:smoking 1/4-1/2 ppd.    Vaping Use    Vaping status: Never Used   Substance Use Topics    Alcohol use: No    Drug use: No           6/19/2023   LAST FHS-7 RESULTS   1st degree relative breast or ovarian cancer No   Any relative bilateral breast cancer No   Any male have breast cancer No   Any ONE woman have BOTH breast AND ovarian cancer No   Any woman with breast cancer before 50yrs Yes   2 or more relatives with breast AND/OR ovarian cancer No   2 or more relatives with breast AND/OR bowel cancer No        Mammogram Screening - Mammogram every 1-2 years updated in Health Maintenance based on mutual decision making      History of abnormal Pap smear: No - age 30- 64 PAP with HPV every 5 years recommended        10/4/2012    12:51 PM   PAP / HPV   PAP (Historical) NIL      ASCVD Risk   The 10-year ASCVD risk score (Collin WILKINSON, et al., 2019) is: 10.9%    Values used to calculate the score:      Age: 58 years      Sex: Female      Is Non- :  No      Diabetic: No      Tobacco smoker: Yes      Systolic Blood Pressure: 122 mmHg      Is BP treated: Yes      HDL Cholesterol: 37 mg/dL      Total Cholesterol: 215 mg/dL    Fracture Risk Assessment Tool  Link to Frax Calculator  Use the information below to complete the Frax calculator  : 1965  Sex: female  Weight (kg): 82.6 kg (actual weight)  Height (cm): 167.6 cm  Previous Fragility Fracture:  No  History of parent with fractured hip:  No  Current Smoking:  Yes  Patient has been on glucocorticoids for more than 3 months (5mg/day or more): No  Rheumatoid Arthritis on Problem List:  No  Secondary Osteoporosis on Problem List:  No  Consumes 3 or more units of alcohol per day: No  Femoral Neck BMD (g/cm2)           Reviewed and updated as needed this visit by Provider                    Labs reviewed in EPIC  BP Readings from Last 3 Encounters:   24 122/85   23 136/82   23 114/81    Wt Readings from Last 3 Encounters:   24 82.6 kg (182 lb)   23 83.5 kg (184 lb)   23 83.9 kg (185 lb)                  Patient Active Problem List   Diagnosis    Anxiety state    Esophageal reflux    Breast screening    HYPERLIPIDEMIA LDL GOAL <130    Hypertension goal BP (blood pressure) < 140/90    Tobacco abuse     Past Surgical History:   Procedure Laterality Date    SURGICAL HISTORY OF -       Breast biopsy - benign       Social History     Tobacco Use    Smoking status: Every Day     Current packs/day: 0.50     Average packs/day: 0.5 packs/day for 37.0 years (18.5 ttl pk-yrs)     Types: Cigarettes    Smokeless tobacco: Never    Tobacco comments:     started at age 19.  2010:smoking 1/4-1/2 ppd.    Substance Use Topics    Alcohol use: No     Family History   Problem Relation Age of Onset    Lipids Mother     Eye Disorder Mother         degenerative disease    Hypertension Father     Cardiovascular Father         Blood clots, legs    Cardiovascular Maternal Grandmother     Lipids  "Sister     Lipids Sister     C.A.D. Brother         MI in mid 40s    Heart Disease Brother 40        MI    Breast Cancer Other         Pat cousin          Current Outpatient Medications   Medication Sig Dispense Refill    amitriptyline (ELAVIL) 25 MG tablet TAKE ONE TABLET BY MOUTH EVERY NIGHT AT BEDTIME FOR PREVENTING ANXIETY 90 tablet 3    famotidine (PEPCID) 20 MG tablet Take 1 tablet (20 mg) by mouth 2 times daily as needed (gerd) 90 tablet 3    lisinopril (ZESTRIL) 20 MG tablet Take 1 tablet (20 mg) by mouth daily 90 tablet 3    pravastatin (PRAVACHOL) 80 MG tablet TAKE 1 TABLET(80 MG) BY MOUTH DAILY 90 tablet 3     Allergies   Allergen Reactions    Atorvastatin Cramps    Pcn [Penicillins]     Tape [Adhesive Tape]     Latex Rash     Recent Labs   Lab Test 06/19/23  1322 06/01/22  1426 06/04/21  1346 04/03/19  0902   * 115* 113* 117*   HDL 37* 45* 43* 38*   TRIG 285* 244* 231* 255*   ALT 22  --   --   --    CR 0.88 0.74 0.82 0.88   GFRESTIMATED 76 >90 80 74   GFRESTBLACK  --   --  >90 86   POTASSIUM 4.5 4.2 4.1 4.1   TSH 1.54  --   --   --           Review of Systems  Constitutional, HEENT, cardiovascular, pulmonary, gi and gu systems are negative, except as otherwise noted.     Objective    Exam  /85 (BP Location: Right arm, Patient Position: Sitting, Cuff Size: Adult Regular)   Pulse 96   Resp 20   Ht 1.676 m (5' 5.98\")   Wt 82.6 kg (182 lb)   LMP 11/04/2009   SpO2 96%   BMI 29.39 kg/m     Estimated body mass index is 29.39 kg/m  as calculated from the following:    Height as of this encounter: 1.676 m (5' 5.98\").    Weight as of this encounter: 82.6 kg (182 lb).    Physical Exam  GENERAL: alert and no distress  EYES: Eyes grossly normal to inspection, PERRL and conjunctivae and sclerae normal  HENT: ear canals and TM's normal, nose and mouth without ulcers or lesions  NECK: no adenopathy, no asymmetry, masses, or scars  RESP: lungs clear to auscultation - no rales, rhonchi or wheezes  CV: " regular rate and rhythm, normal S1 S2, no S3 or S4, no murmur, click or rub, no peripheral edema  MS: no gross musculoskeletal defects noted, no edema  SKIN: no suspicious lesions or rashes  NEURO: Normal strength and tone, mentation intact and speech normal  PSYCH: mentation appears normal, affect normal/bright        Signed Electronically by: ZACHARY Sexton CNP

## 2024-06-11 NOTE — PATIENT INSTRUCTIONS
"Preventive Care Advice   This is general advice we often give to help people stay healthy. Your care team may have specific advice just for you. Please talk to your care team about your own preventive care needs.  Lifestyle  Exercise at least 150 minutes each week (30 minutes a day, 5 days a week).  Do muscle strengthening activities 2 days a week. These help control your weight and prevent disease.  No smoking.  Wear sunscreen to prevent skin cancer.  Have your home tested for radon every 2 to 5 years. Radon is a colorless, odorless gas that can harm your lungs. To learn more, go to www.health.[facility].mn. and search for \"Radon in Homes.\"  Keep guns unloaded and locked up in a safe place like a safe or gun vault, or, use a gun lock and hide the keys. Always lock away bullets separately. To learn more, visit [employer].mn.gov and search for \"safe gun storage.\"  Nutrition  Eat 5 or more servings of fruits and vegetables each day.  Try wheat bread, brown rice and whole grain pasta (instead of white bread, rice, and pasta).  Get enough calcium and vitamin D. Check the label on foods and aim for 100% of the RDA (recommended daily allowance).  Regular exams  Have a dental exam and cleaning every 6 months.  See your health care team every year to talk about:  Any changes in your health.  Any medicines your care team has prescribed.  Preventive care, family planning, and ways to prevent chronic diseases.  Shots (vaccines)   HPV shots (up to age 26), if you've never had them before.  Hepatitis B shots (up to age 59), if you've never had them before.  COVID-19 shot: Get this shot when it's due.  Flu shot: Get a flu shot every year.  Tetanus shot: Get a tetanus shot every 10 years.  Pneumococcal, hepatitis A, and RSV shots: Ask your care team if you need these based on your risk.  Shingles shot (for age 50 and up).  General health tests  Diabetes screening:  Starting at age 35, Get screened for diabetes at least every 3 years.  If " you are younger than age 35, ask your care team if you should be screened for diabetes.  Cholesterol test: At age 39, start having a cholesterol test every 5 years, or more often if advised.  Bone density scan (DEXA): At age 50, ask your care team if you should have this scan for osteoporosis (brittle bones).  Hepatitis C: Get tested at least once in your life.  Abdominal aortic aneurysm screening: Talk to your doctor about having this screening if you:  Have ever smoked; and  Are biologically male; and  Are between the ages of 65 and 75.  STIs (sexually transmitted infections)  Before age 24: Ask your care team if you should be screened for STIs.  After age 24: Get screened for STIs if you're at risk. You are at risk for STIs (including HIV) if:  You are sexually active with more than one person.  You don't use condoms every time.  You or a partner was diagnosed with a sexually transmitted infection.  If you are at risk for HIV, ask about PrEP medicine to prevent HIV.  Get tested for HIV at least once in your life, whether you are at risk for HIV or not.  Cancer screening tests  Cervical cancer screening: If you have a cervix, begin getting regular cervical cancer screening tests at age 21. Most people who have regular screenings with normal results can stop after age 65. Talk about this with your provider.  Breast cancer scan (mammogram): If you've ever had breasts, begin having regular mammograms starting at age 40. This is a scan to check for breast cancer.  Colon cancer screening: It is important to start screening for colon cancer at age 45.  Have a colonoscopy test every 10 years (or more often if you're at risk) Or, ask your provider about stool tests like a FIT test every year or Cologuard test every 3 years.  To learn more about your testing options, visit: www.[employer]/449736.pdf.  For help making a decision, visit: ephraim/wl86935.  Prostate cancer screening test: If you have a prostate and are age 55  to 69, ask your provider if you would benefit from a yearly prostate cancer screening test.  Lung cancer screening: If you are a current or former smoker age 50 to 80, ask your care team if ongoing lung cancer screenings are right for you.  For informational purposes only. Not to replace the advice of your health care provider. Copyright   2023 [employer]. All rights reserved. Clinically reviewed by the [facility] Transitions Program. Pikhub [id number] - REV 04/24.

## 2024-06-11 NOTE — NURSING NOTE
"No chief complaint on file.      Initial LMP 11/04/2009  Estimated body mass index is 29.7 kg/m  as calculated from the following:    Height as of 6/19/23: 1.676 m (5' 6\").    Weight as of 6/19/23: 83.5 kg (184 lb).    Patient presents to the clinic using No DME    Is there anyone who you would like to be able to receive your results? No  If yes have patient fill out KATINA      "

## 2024-06-12 VITALS
DIASTOLIC BLOOD PRESSURE: 85 MMHG | HEART RATE: 96 BPM | OXYGEN SATURATION: 96 % | WEIGHT: 182 LBS | TEMPERATURE: 98.4 F | BODY MASS INDEX: 29.25 KG/M2 | RESPIRATION RATE: 20 BRPM | SYSTOLIC BLOOD PRESSURE: 122 MMHG | HEIGHT: 66 IN

## 2024-12-02 NOTE — PROGRESS NOTES
Medicare Wellness Visit  Plan for Preventive Care    A good way for you to stay healthy is to use preventive care.  Medicare covers many services that can help you stay healthy.* The goal of these services is to find any health problems as quickly as possible. Finding problems early can help make them easier to treat.  Your personal plan below lists the services you may need and when they are due.      Health Maintenance Summary     Traditional Medicare- Medicare Wellness Visit (Yearly)  Overdue since 11/1/2024    Depression Screening (Yearly)  Next due on 11/25/2025    DTaP/Tdap/Td Vaccine (2 - Td or Tdap)  Next due on 5/3/2026    Breast Cancer Screening (Every 2 Years)  Next due on 10/17/2026    Colorectal Cancer Screen (Colonoscopy - Every 10 Years)  Next due on 4/6/2032    Hepatitis C Screening   Completed    Shingles Vaccine   Completed    Pneumococcal Vaccine 65+   Completed    Osteoporosis Screening   Completed    Influenza Vaccine   Completed    COVID-19 Vaccine   Completed    Meningococcal Vaccine   Aged Out    Hepatitis B Vaccine (For Physician/[facility] Discussion)   Aged Out    HPV Vaccine   Aged Out    Colorectal Cancer Risk - Colonoscopy   Discontinued           Preventive Care for Women and Men    Heart Screenings (Cardiovascular):  Blood tests are used to check your cholesterol, lipid and triglyceride levels. High levels can increase your risk for heart disease and stroke. High levels can be treated with medications, diet and exercise. Lowering your levels can help keep your heart and blood vessels healthy.  Your provider will order these tests if they are needed.    An ultrasound is done to see if you have an abdominal aortic aneurysm (AAA).  This is an enlargement of one of the main blood vessels that delivers blood to the body.   In the [place], 9,000 deaths are caused by AAA.  You may not even know you have this problem and as many as 1 in 3 people will have a serious problem if it is not  ASSESSMENT:   (I10) Hypertension goal BP (blood pressure) < 140/90  (primary encounter diagnosis)  Comment: doing well  Plan: lisinopril (ZESTRIL) 20 MG tablet, Basic         metabolic panel        No change in current treatment plan.   REfills.     (F41.1) Anxiety state  Comment: doing well  Plan: amitriptyline (ELAVIL) 25 MG tablet        No change in current treatment plan.   REfills.     (E78.5) Hyperlipidemia LDL goal <130  Comment: due for follow-up   Plan: pravastatin (PRAVACHOL) 80 MG tablet, Lipid         panel reflex to direct LDL Fasting        Fasting blood tests today.   REfills.     (Z12.4) Cervical cancer screening  Comment: due for PAP test.  Last one 2012  Plan: Schedule an appointment for PAP test.     (Z72.0) Tobacco abuse  Comment: continuing to smoke  Plan: I recommended quitting smoking and using tobacco products.     I am sure you are aware of the many health benefits from quitting.   They include for smokers; wrinkling/premature aging of the skin, higher risk for lots of different cancers (including lung cancer), emphysema and much higher risk for heart attacks and strokes.  For smokeless tobacco users, there are mouth, throat and tongue cancers among others.   For vaping tobacco, there is still considerable risk for lung damage, cancer risks from other chemicals inhaled into the lungs.  Many people have  from lung disease related to vaping.     When you are ready to quit smoking, there are various medications available to help if needed including Chantix, Zyban or nicotine replacement products.  On line counseling is also available through the [facility].   We can help with quitting, contact us.     If you are currently smoking or using tobacco, you may want to check out the following information if you are interested in quitting:  [employer]: www.quitpartnermn.com or  QUIT-NOW for access to resources including one-on-one coaching and free medications like nicotine patches,  gum, or lozenges.  SmokeFree: www.smokefree.gov provides a 3 step by step quitting , smart phone application recommendations, live chat, and other resources.  Nicotine Anonymous:www.nicotine-anonymous.org to find meeting near you (virtual as well).     [employer] resources for helping quit smoking: https://www.[employer].org/patient-education/258209     (Z12.11) Special screening for malignant neoplasms, colon  Comment:   Plan: Fecal colorectal cancer screen (FIT)        Complete FIT colon cancer screening test and send in the mail.      Vaccines recommended:  COVID-19 virus vaccine.   Shingles vaccine is recommended for those adults age 50 and older.  The newer vaccine available since 2018 is very effective in preventing shingles (over 90%).  It is not currently covered by Medicare.  Check at pharmacy for this vaccine, they can check on your cost and give you the vaccine.   The vaccine may be less expensive at a pharmacy. .    Does not qualify for lung cancer CT screening test.   18.5 pack year smoking history.     I can remove skin tags at another 20 minute appointment             Mukund Salmeron is a 56 year old who presents for the following health issues  Chief Complaint   Patient presents with     Anxiety     Hypertension     Hyperlipidemia     Derm Problem      Last clinic visit: 21    In for follow-up on multiple medical issues.     Hyperlipidemia Follow-Up      Are you regularly taking any medication or supplement to lower your cholesterol?   Yes- Pravastatin    Are you having muscle aches or other side effects that you think could be caused by your cholesterol lowering medication?  No  Taking pravastatin 80mg daily.     Hypertension Follow-up      Do you check your blood pressure regularly outside of the clinic? No     Are you following a low salt diet? No    Are your blood pressures ever more than 140 on the top number (systolic) OR more   than 90 on the bottom number (diastolic), for example  treated.  Early diagnosis allows for more effective treatment and cure.  If you have a family history of AAA or are a male age 65-75 who has smoked, you are at higher risk of an AAA.  Your provider can order this test, if needed.    Colorectal Screening:  There are many tests that are used to check for cancer of your colon and rectum. You and your provider should discuss what test is best for you and when to have it done.  Options include:  Screening Colonoscopy: exam of the entire colon, seen through a flexible lighted tube.  Flexible Sigmoidoscopy: exam of the last third (sigmoid portion) of the colon and rectum, seen through a flexible lighted tube.  Cologuard DNA stool test: a sample of your stool is used to screen for cancer and unseen blood in your stool.  Fecal Occult Blood Test: a sample of your stool is studied to find any unseen blood    Flu Shot:  An immunization that helps to prevent influenza (the flu). You should get this every year. The best time to get the shot is in the fall.    Pneumococcal Shot:  Vaccines help prevent pneumococcal disease, which is any type of illness caused by Streptococcus pneumoniae bacteria. There are two kinds of pneumococcal vaccines available in the [place]:   Pneumococcal conjugate vaccines (PCV20 or Mnahgvp52®)  Pneumococcal polysaccharide vaccine (PPSV23 or Hfhaezzwf17®)  For those who have never received any pneumococcal conjugate vaccine, CDC recommends PVC20 for adults 65 years or older and adults 19 through 64 years old with certain medical conditions or risk factors.   For those who have previously received PCV13, this should be followed by a dose of PPSV23.     Hepatitis B Shot:  An immunization that helps to protect people from getting Hepatitis B. Hepatitis B is a virus that spreads through contact with infected blood or body fluids. Many people with the virus do not have symptoms.  The virus can lead to serious problems, such as liver disease. Some people  140/90? No  Blood pressure medications currently taking: lisinopril 20mg daily.    Anxiety Follow-Up    How are you doing with your anxiety since your last visit? No change    Are you having other symptoms that might be associated with anxiety? No    Have you had a significant life event? Retired it's good.     Are you feeling depressed? No    Do you have any concerns with your use of alcohol or other drugs? No  Taking amitriptyline 25mg at bedtime.   No side effects noted from any meds.     Uses ear drops on occasion for flares.     Problem 4:   Derm:  Skintags on neck and left eyelid   Dudration: months  Some getting larger.    Social History     Tobacco Use     Smoking status: Current Every Day Smoker     Packs/day: 0.50     Years: 28.00     Pack years: 14.00     Types: Cigarettes     Smokeless tobacco: Never Used     Tobacco comment: started at age 19.  2/12/2010:smoking 1/4-1/2 ppd.    Substance Use Topics     Alcohol use: No     Drug use: No   Smoking 1/2 ppd.  She has been cutting back.  Not ready to quit.       ALISHA-7 SCORE 3/5/2018 3/5/2018 3/25/2020   Total Score - - -   Total Score 0 0 0     PHQ 3/5/2018 3/5/2018 3/25/2020   PHQ-9 Total Score 4 4 0   Q9: Thoughts of better off dead/self-harm past 2 weeks Not at all Not at all Not at all   She feels mood and anxiety are doing well.         How many days per week do you miss taking your medication? 0    Patient Active Problem List   Diagnosis     Anxiety state     Esophageal reflux     Breast screening     HYPERLIPIDEMIA LDL GOAL <130     Hypertension goal BP (blood pressure) < 140/90     Tobacco abuse      ROS:General: No change in weight, sleep or appetite.  Normal energy.  No fever or chills  Resp: No coughing, wheezing or shortness of breath  CV: No chest pains or palpitations  GI: No nausea, vomiting,  heartburn, abdominal pain, diarrhea, constipation or change in bowel habits  : No urinary frequency or dysuria, bladder or kidney  are at higher risk than others. Your doctor will tell you if you need this shot.     Diabetes Screening:  A test to measure sugar (glucose) in your blood is called a fasting blood sugar. Fasting means you cannot have food or drink for at least 8 hours before the test. This test can detect diabetes long before you may notice symptoms.    Glaucoma Screening:  Glaucoma screening is performed by your eye doctor. The test measures the fluid pressure inside your eyes to determine if you have glaucoma.     Hepatitis C Screening:  A blood test to see if you have the hepatitis C virus.  Hepatitis C attacks the liver and is a major cause of chronic liver disease.  Medicare will cover a single screening for all adults born between 1945 & 1965, or high risk patients (people who have injected illegal drugs or people who have had blood transfusions).  High risk patients who continue to inject illegal drugs can be screened for Hepatitis C every year.    Smoking and Tobacco-Use Cessation Counseling:  Tobacco is the single greatest cause of disease and early death in our country today. Medication and counseling together can increase a person’s chance of quitting for good.   Medicare covers two quitting attempts per year, with four counseling sessions per attempt (eight sessions in a 12 month period)    Preventive Screening tests for Women    Screening Mammograms and Breast Exams:  An x-ray of your breasts to check for breast cancer before you or your doctor may be able to feel it.  If breast cancer is found early it can usually be treated with success.    Pelvic Exams and Pap Tests:  An exam to check for cervical and vaginal cancer. A Pap test is a lab test in which cells are taken from your cervix and sent to the lab to look for signs of cervical cancer. If cancer of the cervix is found early, chances for a cure are good. Testing can generally end at age 65, or if a woman has a hysterectomy for a benign condition. Your provider may  problems  Musculoskeletal: POSITIVE  for:, pain right knee.  Injured a few months ago.   Improved over time.   Psychiatric: see above     OBJECTIVE:Blood pressure 120/82, pulse 96, temperature 97.5  F (36.4  C), temperature source Tympanic, resp. rate 18, weight 81.9 kg (180 lb 9.6 oz), last menstrual period 11/04/2009, SpO2 99 %, not currently breastfeeding. BMI=Body mass index is 29.15 kg/m .  GENERAL APPEARANCE ADULT: Alert, no acute distress  NECK: No adenopathy,masses or thyromegaly  RESP: lungs clear to auscultation   CV: normal rate, regular rhythm, no murmur or gallop  ABDOMEN: soft, no organomegaly, masses or tenderness  MS: extremities normal, no peripheral edema  SKIN: skin tags 1-2mm on neck and left upper eyelid.    Benign.        recommend more frequent testing if certain abnormal results are found.    Bone Mass Measurements:  A painless x-ray of your bone density to see if you are at risk for a broken bone. Bone density refers to the thickness of bones or how tightly the bone tissue is packed.    Preventive Screening tests for Men    Prostate Screening:  Should you have a prostate cancer test (PSA)?  It is up to you to decide if you want a prostate cancer test. Talk to your clinician to find out if the test is right for you.  Things for you to consider and talk about should include:  Benefits and harms of the test  Your family history  How your race/ethnicity may influence the test  If the test may impact other medical conditions you have  Your values on screenings and treatments    *Medicare pays for many preventive services to keep you healthy. For some of these services, you might have to pay a deductible, coinsurance, and / or copayment.  The amounts vary depending on the type of services you need and the kind of Medicare health plan you have.    For further details on screenings offered by Medicare please visit: https://www.medicare.gov/coverage/preventive-screening-services

## 2025-02-25 ENCOUNTER — TELEPHONE (OUTPATIENT)
Dept: FAMILY MEDICINE | Facility: CLINIC | Age: 60
End: 2025-02-25
Payer: COMMERCIAL

## 2025-02-25 NOTE — TELEPHONE ENCOUNTER
Patient Quality Outreach    Patient is due for the following:   Cervical Cancer Screening - PAP Needed    Action(s) Taken:   Patient declined follow up at this time.    Type of outreach:    Chart review performed, no outreach needed.    Questions for provider review:    None           Mary Hartman CMA

## 2025-05-12 ENCOUNTER — PATIENT OUTREACH (OUTPATIENT)
Dept: CARE COORDINATION | Facility: CLINIC | Age: 60
End: 2025-05-12
Payer: COMMERCIAL

## 2025-05-20 ENCOUNTER — PATIENT OUTREACH (OUTPATIENT)
Dept: CARE COORDINATION | Facility: CLINIC | Age: 60
End: 2025-05-20
Payer: COMMERCIAL

## 2025-05-26 ENCOUNTER — PATIENT OUTREACH (OUTPATIENT)
Dept: CARE COORDINATION | Facility: CLINIC | Age: 60
End: 2025-05-26
Payer: COMMERCIAL

## 2025-06-03 DIAGNOSIS — F41.1 ANXIETY STATE: ICD-10-CM

## 2025-06-03 NOTE — TELEPHONE ENCOUNTER
Pending Prescriptions:                       Disp   Refills    amitriptyline (ELAVIL) 25 MG tablet       90 tab*3            Sig: TAKE ONE TABLET BY MOUTH EVERY NIGHT AT BEDTIME           FOR PREVENTING ANXIETY    Patient requesting a 30 supply.    Last office visit: 6/11/2024 ;   Future Office Visit:  6/26/2026

## 2025-06-26 ENCOUNTER — OFFICE VISIT (OUTPATIENT)
Dept: FAMILY MEDICINE | Facility: CLINIC | Age: 60
End: 2025-06-26
Attending: NURSE PRACTITIONER
Payer: COMMERCIAL

## 2025-06-26 VITALS
HEART RATE: 94 BPM | WEIGHT: 194 LBS | OXYGEN SATURATION: 97 % | TEMPERATURE: 97.9 F | BODY MASS INDEX: 31.18 KG/M2 | SYSTOLIC BLOOD PRESSURE: 130 MMHG | DIASTOLIC BLOOD PRESSURE: 84 MMHG | HEIGHT: 66 IN | RESPIRATION RATE: 16 BRPM

## 2025-06-26 DIAGNOSIS — Z13.6 SCREENING FOR CARDIOVASCULAR CONDITION: ICD-10-CM

## 2025-06-26 DIAGNOSIS — E78.5 HYPERLIPIDEMIA LDL GOAL <130: ICD-10-CM

## 2025-06-26 DIAGNOSIS — F41.1 ANXIETY STATE: ICD-10-CM

## 2025-06-26 DIAGNOSIS — Z00.00 ROUTINE GENERAL MEDICAL EXAMINATION AT A HEALTH CARE FACILITY: Primary | ICD-10-CM

## 2025-06-26 DIAGNOSIS — K21.00 GASTROESOPHAGEAL REFLUX DISEASE WITH ESOPHAGITIS WITHOUT HEMORRHAGE: ICD-10-CM

## 2025-06-26 DIAGNOSIS — I10 HYPERTENSION GOAL BP (BLOOD PRESSURE) < 140/90: ICD-10-CM

## 2025-06-26 DIAGNOSIS — Z12.4 CERVICAL CANCER SCREENING: ICD-10-CM

## 2025-06-26 LAB
ANION GAP SERPL CALCULATED.3IONS-SCNC: 14 MMOL/L (ref 7–15)
BUN SERPL-MCNC: 11.8 MG/DL (ref 8–23)
CALCIUM SERPL-MCNC: 9.6 MG/DL (ref 8.8–10.4)
CHLORIDE SERPL-SCNC: 106 MMOL/L (ref 98–107)
CHOLEST SERPL-MCNC: 232 MG/DL
CREAT SERPL-MCNC: 0.85 MG/DL (ref 0.51–0.95)
EGFRCR SERPLBLD CKD-EPI 2021: 78 ML/MIN/1.73M2
ERYTHROCYTE [DISTWIDTH] IN BLOOD BY AUTOMATED COUNT: 13 % (ref 10–15)
FASTING STATUS PATIENT QL REPORTED: YES
FASTING STATUS PATIENT QL REPORTED: YES
GLUCOSE SERPL-MCNC: 112 MG/DL (ref 70–99)
HCO3 SERPL-SCNC: 19 MMOL/L (ref 22–29)
HCT VFR BLD AUTO: 43.4 % (ref 35–47)
HDLC SERPL-MCNC: 37 MG/DL
HGB BLD-MCNC: 14.6 G/DL (ref 11.7–15.7)
HPV HR 12 DNA CVX QL NAA+PROBE: NEGATIVE
HPV16 DNA CVX QL NAA+PROBE: NEGATIVE
HPV18 DNA CVX QL NAA+PROBE: NEGATIVE
HUMAN PAPILLOMA VIRUS FINAL DIAGNOSIS: NORMAL
LDLC SERPL CALC-MCNC: 154 MG/DL
MCH RBC QN AUTO: 29.7 PG (ref 26.5–33)
MCHC RBC AUTO-ENTMCNC: 33.6 G/DL (ref 31.5–36.5)
MCV RBC AUTO: 88 FL (ref 78–100)
NONHDLC SERPL-MCNC: 195 MG/DL
PLATELET # BLD AUTO: 312 10E3/UL (ref 150–450)
POTASSIUM SERPL-SCNC: 4.4 MMOL/L (ref 3.4–5.3)
RBC # BLD AUTO: 4.92 10E6/UL (ref 3.8–5.2)
SODIUM SERPL-SCNC: 139 MMOL/L (ref 135–145)
TRIGL SERPL-MCNC: 207 MG/DL
WBC # BLD AUTO: 8.4 10E3/UL (ref 4–11)

## 2025-06-26 RX ORDER — LISINOPRIL 20 MG/1
20 TABLET ORAL DAILY
Qty: 90 TABLET | Refills: 3 | Status: SHIPPED | OUTPATIENT
Start: 2025-06-26

## 2025-06-26 RX ORDER — PRAVASTATIN SODIUM 80 MG/1
TABLET ORAL
Qty: 90 TABLET | Refills: 3 | Status: SHIPPED | OUTPATIENT
Start: 2025-06-26

## 2025-06-26 RX ORDER — ATORVASTATIN CALCIUM 20 MG/1
20 TABLET, FILM COATED ORAL
COMMUNITY
End: 2025-06-26 | Stop reason: ALTCHOICE

## 2025-06-26 RX ORDER — FAMOTIDINE 20 MG/1
20 TABLET, FILM COATED ORAL 2 TIMES DAILY PRN
Qty: 90 TABLET | Refills: 3 | Status: SHIPPED | OUTPATIENT
Start: 2025-06-26

## 2025-06-26 SDOH — HEALTH STABILITY: PHYSICAL HEALTH: ON AVERAGE, HOW MANY MINUTES DO YOU ENGAGE IN EXERCISE AT THIS LEVEL?: 10 MIN

## 2025-06-26 SDOH — HEALTH STABILITY: PHYSICAL HEALTH: ON AVERAGE, HOW MANY DAYS PER WEEK DO YOU ENGAGE IN MODERATE TO STRENUOUS EXERCISE (LIKE A BRISK WALK)?: 3 DAYS

## 2025-06-26 ASSESSMENT — PAIN SCALES - GENERAL: PAINLEVEL_OUTOF10: NO PAIN (0)

## 2025-06-26 ASSESSMENT — SOCIAL DETERMINANTS OF HEALTH (SDOH): HOW OFTEN DO YOU GET TOGETHER WITH FRIENDS OR RELATIVES?: MORE THAN THREE TIMES A WEEK

## 2025-06-26 NOTE — PROGRESS NOTES
"Preventive Care Visit  [facility]  ZACHARY Sexton CNP, Family Medicine  Jun 26, 2025      Assessment & Plan     Routine general medical examination at a health care facility  - CBC with platelets; Future  - CBC with platelets    Anxiety state  Stable   - amitriptyline (ELAVIL) 25 MG tablet; TAKE ONE TABLET BY MOUTH EVERY NIGHT AT BEDTIME FOR PREVENTING ANXIETY    Hyperlipidemia LDL goal <130  Stable   - Lipid panel reflex to direct LDL Non-fasting; Future  - pravastatin (PRAVACHOL) 80 MG tablet; TAKE 1 TABLET(80 MG) BY MOUTH DAILY  - Lipid panel reflex to direct LDL Non-fasting    Hypertension goal BP (blood pressure) < 140/90  Stable   - BASIC METABOLIC PANEL; Future  - lisinopril (ZESTRIL) 20 MG tablet; Take 1 tablet (20 mg) by mouth daily.  - BASIC METABOLIC PANEL    Gastroesophageal reflux disease with esophagitis without hemorrhage  Stable   - famotidine (PEPCID) 20 MG tablet; Take 1 tablet (20 mg) by mouth 2 times daily as needed (gerd).    Cervical cancer screening  - HPV and Gynecologic Cytology Panel - Recommended Age 30 - 65 Years    Screening for cardiovascular condition      Patient has been advised of split billing requirements and indicates understanding: Yes        Nicotine/Tobacco Cessation  She reports that she has been smoking cigarettes. She has a 18.5 pack-year smoking history. She has never used smokeless tobacco.  Nicotine/Tobacco Cessation Plan  Information offered: Patient not interested at this time      BMI  Estimated body mass index is 31.31 kg/m  as calculated from the following:    Height as of this encounter: 1.676 m (5' 6\").    Weight as of this encounter: 88 kg (194 lb).   Weight management plan: Discussed healthy diet and exercise guidelines  Reviewed preventive health counseling, as reflected in patient instructions       Regular exercise       Healthy diet/nutrition       Vision screening       Hearing screening       Osteoporosis prevention/bone " health       Colorectal Cancer Screening       (Wilma)menopause management  Counseling  Appropriate preventive services were addressed with this patient via screening, questionnaire, or discussion as appropriate for fall prevention, nutrition, physical activity, Tobacco-use cessation, social engagement, weight loss and cognition.  Checklist reviewing preventive services available has been given to the patient.  Reviewed patient's diet, addressing concerns and/or questions.   She is at risk for lack of exercise and has been provided with information to increase physical activity for the benefit of her well-being.   The patient was instructed to see the dentist every 6 months.   She is at risk for psychosocial distress and has been provided with information to reduce risk.         Follow-up    Follow-up Visit   Expected date:  Jun 26, 2026 (Approximate)      Follow Up Appointment Details:     Follow-up with whom?: PCP    Follow-Up for what?: Adult Preventive    How?: In Person                 Mukund Salmeron is a 59 year old, presenting for the following:  Physical        6/26/2025     8:21 AM   Additional Questions   Roomed by Mary WEINSTEIN           Advance Care Planning    Discussed advance care planning with patient; informed AVS has link to [employer].        6/26/2025   General Health   How would you rate your overall physical health? Good   Feel stress (tense, anxious, or unable to sleep) To some extent   (!) STRESS CONCERN      6/26/2025   Nutrition   Three or more servings of calcium each day? (!) NO   Diet: Regular (no restrictions)   How many servings of fruit and vegetables per day? (!) 0-1   How many sweetened beverages each day? (!) 3         6/26/2025   Exercise   Days per week of moderate/strenous exercise 3 days   Average minutes spent exercising at this level 10 min         6/26/2025   Social Factors   Frequency of gathering with friends or relatives More than three times a week   Worry  food won't last until get money to buy more No   Food not last or not have enough money for food? No   Do you have housing? (Housing is defined as stable permanent housing and does not include staying outside in a car, in a tent, in an abandoned building, in an overnight shelter, or couch-surfing.) Yes   Are you worried about losing your housing? No   Lack of transportation? No   Unable to get utilities (heat,electricity)? No         6/26/2025   Fall Risk   Fallen 2 or more times in the past year? No   Trouble with walking or balance? No          6/26/2025   Dental   Dentist two times every year? (!) NO         Today's PHQ-2 Score:       6/26/2025     8:17 AM   PHQ-2 ( 1999 [employer])   Q1: Little interest or pleasure in doing things 0   Q2: Feeling down, depressed or hopeless 0   PHQ-2 Score 0    Q1: Little interest or pleasure in doing things Not at all   Q2: Feeling down, depressed or hopeless Not at all   PHQ-2 Score 0       Patient-reported           6/26/2025   Substance Use   Alcohol more than 3/day or more than 7/wk No   Do you use any other substances recreationally? No     Social History     Tobacco Use    Smoking status: Every Day     Current packs/day: 0.50     Average packs/day: 0.5 packs/day for 37.0 years (18.5 ttl pk-yrs)     Types: Cigarettes    Smokeless tobacco: Never    Tobacco comments:     started at age 19.  2/12/2010:smoking 1/4-1/2 ppd.    Vaping Use    Vaping status: Never Used   Substance Use Topics    Alcohol use: No    Drug use: No           6/19/2023   LAST FHS-7 RESULTS   1st degree relative breast or ovarian cancer No   Any relative bilateral breast cancer No   Any male have breast cancer No   Any ONE woman have BOTH breast AND ovarian cancer No   Any woman with breast cancer before 50yrs Yes   2 or more relatives with breast AND/OR ovarian cancer No   2 or more relatives with breast AND/OR bowel cancer No        Mammogram Screening - Mammogram every 1-2 years updated in Health  Maintenance based on mutual decision making        2025   STI Screening   New sexual partner(s) since last STI/HIV test? No     History of abnormal Pap smear: No - age 21 - 65 - Patient with other risk factor requiring more frequent screening - see link Cervical Cytology Screening Guidelines        10/4/2012    12:51 PM   PAP / HPV   PAP (Historical) NIL      ASCVD Risk   The 10-year ASCVD risk score (Collin WILKINSON, et al., 2019) is: 12.1%    Values used to calculate the score:      Age: 59 years      Sex: Female      Is Non- : No      Diabetic: No      Tobacco smoker: Yes      Systolic Blood Pressure: 130 mmHg      Is BP treated: Yes      HDL Cholesterol: 39 mg/dL      Total Cholesterol: 206 mg/dL    Fracture Risk Assessment Tool  Link to Frax Calculator  Use the information below to complete the Frax calculator  : 1965  Sex: female  Weight (kg): 88 kg (actual weight)  Height (cm): 167.6 cm  Previous Fragility Fracture:  No  History of parent with fractured hip:  No  Current Smoking:  Yes  Patient has been on glucocorticoids for more than 3 months (5mg/day or more): No  Rheumatoid Arthritis on Problem List:  No  Secondary Osteoporosis on Problem List:  No  Consumes 3 or more units of alcohol per day: No  Femoral Neck BMD (g/cm2)           Reviewed and updated as needed this visit by Provider                    BP Readings from Last 3 Encounters:   25 130/84   24 122/85   23 136/82    Wt Readings from Last 3 Encounters:   25 88 kg (194 lb)   24 82.6 kg (182 lb)   23 83.5 kg (184 lb)                  Patient Active Problem List   Diagnosis    Anxiety state    Esophageal reflux    Breast screening    HYPERLIPIDEMIA LDL GOAL <130    Hypertension goal BP (blood pressure) < 140/90    Tobacco abuse     Past Surgical History:   Procedure Laterality Date    SURGICAL HISTORY OF -       Breast biopsy - benign       Social History     Tobacco Use     Smoking status: Every Day     Current packs/day: 0.50     Average packs/day: 0.5 packs/day for 37.0 years (18.5 ttl pk-yrs)     Types: Cigarettes    Smokeless tobacco: Never    Tobacco comments:     started at age 19.  2/12/2010:smoking 1/4-1/2 ppd.    Substance Use Topics    Alcohol use: No     Family History   Problem Relation Age of Onset    Lipids Mother     Eye Disorder Mother         degenerative disease    Hypertension Father     Cardiovascular Father         Blood clots, legs    Cardiovascular Maternal Grandmother     Lipids Sister     Lipids Sister     C.A.D. Brother         MI in mid 40s    Heart Disease Brother 40        MI    Breast Cancer Other         Pat cousin          Current Outpatient Medications   Medication Sig Dispense Refill    amitriptyline (ELAVIL) 25 MG tablet TAKE ONE TABLET BY MOUTH EVERY NIGHT AT BEDTIME FOR PREVENTING ANXIETY 90 tablet 0    famotidine (PEPCID) 20 MG tablet Take 1 tablet (20 mg) by mouth 2 times daily as needed (gerd) 90 tablet 3    lisinopril (ZESTRIL) 20 MG tablet Take 1 tablet (20 mg) by mouth daily 90 tablet 3    pravastatin (PRAVACHOL) 80 MG tablet TAKE 1 TABLET(80 MG) BY MOUTH DAILY 90 tablet 3    atorvastatin (LIPITOR) 20 MG tablet Take 20 mg by mouth.       Allergies   Allergen Reactions    Atorvastatin Cramps    Pcn [Penicillins]     Tape [Adhesive Tape]     Latex Rash     Recent Labs   Lab Test 06/11/24  0841 06/19/23  1322 06/01/22  1426 06/01/22  1426 06/04/21  1346 04/03/19  0902   * 121*  --  115* 113* 117*   HDL 39* 37*  --  45* 43* 38*   TRIG 141 285*  --  244* 231* 255*   ALT 22 22  --   --   --   --    CR 0.93 0.88   < > 0.74 0.82 0.88   GFRESTIMATED 70 76   < > >90 80 74   GFRESTBLACK  --   --   --   --  >90 86   POTASSIUM 4.6 4.5  --  4.2 4.1 4.1   TSH  --  1.54  --   --   --   --     < > = values in this interval not displayed.               Review of Systems  Constitutional, HEENT, cardiovascular, pulmonary, gi and gu systems are negative,  "except as otherwise noted.     Objective    Exam  /84   Pulse 94   Temp 97.9  F (36.6  C) (Tympanic)   Resp 16   Ht 1.676 m (5' 6\")   Wt 88 kg (194 lb)   LMP 11/04/2009   SpO2 97%   BMI 31.31 kg/m     Estimated body mass index is 31.31 kg/m  as calculated from the following:    Height as of this encounter: 1.676 m (5' 6\").    Weight as of this encounter: 88 kg (194 lb).    Physical Exam  GENERAL: alert and no distress  EYES: Eyes grossly normal to inspection, PERRL and conjunctivae and sclerae normal  HENT: ear canals and TM's normal, nose and mouth without ulcers or lesions  NECK: no adenopathy, no asymmetry, masses, or scars  RESP: lungs clear to auscultation - no rales, rhonchi or wheezes  CV: regular rate and rhythm, normal S1 S2, no S3 or S4, no murmur, click or rub, no peripheral edema  ABDOMEN: soft, nontender, no hepatosplenomegaly, no masses and bowel sounds normal  MS: no gross musculoskeletal defects noted, no edema  SKIN: no suspicious lesions or rashes  NEURO: Normal strength and tone, mentation intact and speech normal  PSYCH: mentation appears normal, affect normal/bright        Signed Electronically by: ZACHARY Sexton CNP    "

## 2025-06-26 NOTE — PATIENT INSTRUCTIONS
Patient Education   Preventive Care Advice   This is general advice given by our system to help you stay healthy. However, your care team may have specific advice just for you. Please talk to your care team about your preventive care needs.  Nutrition  Eat 5 or more servings of fruits and vegetables each day.  Try wheat bread, brown rice and whole grain pasta (instead of white bread, rice, and pasta).  Get enough calcium and vitamin D. Check the label on foods and aim for 100% of the RDA (recommended daily allowance).  Lifestyle  Exercise at least 150 minutes each week  (30 minutes a day, 5 days a week).  Do muscle strengthening activities 2 days a week. These help control your weight and prevent disease.  No smoking.  Wear sunscreen to prevent skin cancer.  Have a dental exam and cleaning every 6 months.  Yearly exams  See your health care team every year to talk about:  Any changes in your health.  Any medicines your care team has prescribed.  Preventive care, family planning, and ways to prevent chronic diseases.  Shots (vaccines)   HPV shots (up to age 26), if you've never had them before.  Hepatitis B shots (up to age 59), if you've never had them before.  COVID-19 shot: Get this shot when it's due.  Flu shot: Get a flu shot every year.  Tetanus shot: Get a tetanus shot every 10 years.  Pneumococcal, hepatitis A, and RSV shots: Ask your care team if you need these based on your risk.  Shingles shot (for age 50 and up)  General health tests  Diabetes screening:  Starting at age 35, Get screened for diabetes at least every 3 years.  If you are younger than age 35, ask your care team if you should be screened for diabetes.  Cholesterol test: At age 39, start having a cholesterol test every 5 years, or more often if advised.  Bone density scan (DEXA): At age 50, ask your care team if you should have this scan for osteoporosis (brittle bones).  Hepatitis C: Get tested at least once in your life.  STIs (sexually  transmitted infections)  Before age 24: Ask your care team if you should be screened for STIs.  After age 24: Get screened for STIs if you're at risk. You are at risk for STIs (including HIV) if:  You are sexually active with more than one person.  You don't use condoms every time.  You or a partner was diagnosed with a sexually transmitted infection.  If you are at risk for HIV, ask about PrEP medicine to prevent HIV.  Get tested for HIV at least once in your life, whether you are at risk for HIV or not.  Cancer screening tests  Cervical cancer screening: If you have a cervix, begin getting regular cervical cancer screening tests starting at age 21.  Breast cancer scan (mammogram): If you've ever had breasts, begin having regular mammograms starting at age 40. This is a scan to check for breast cancer.  Colon cancer screening: It is important to start screening for colon cancer at age 45.  Have a colonoscopy test every 10 years (or more often if you're at risk) Or, ask your provider about stool tests like a FIT test every year or Cologuard test every 3 years.  To learn more about your testing options, visit:   .  For help making a decision, visit:   https://bit.ly/ex31400.  Prostate cancer screening test: If you have a prostate, ask your care team if a prostate cancer screening test (PSA) at age 55 is right for you.  Lung cancer screening: If you are a current or former smoker ages 50 to 80, ask your care team if ongoing lung cancer screenings are right for you.  For informational purposes only. Not to replace the advice of your health care provider. Copyright   2023 [facility]. All rights reserved. Clinically reviewed by the [facility]. DPSI 899806 - REV 01/24.  Learning About Stress  What is stress?     Stress is your body's response to a hard situation. Your body can have a physical, emotional, or mental response. Stress is a fact of life for most people, and it  affects everyone differently. What causes stress for you may not be stressful for someone else.  A lot of things can cause stress. You may feel stress when you go on a job interview, take a test, or run a race. This kind of short-term stress is normal and even useful. It can help you if you need to work hard or react quickly. For example, stress can help you finish an important job on time.  Long-term stress is caused by ongoing stressful situations or events. Examples of long-term stress include long-term health problems, ongoing problems at work, or conflicts in your family. Long-term stress can harm your health.  How does stress affect your health?  When you are stressed, your body responds as though you are in danger. It makes hormones that speed up your heart, make you breathe faster, and give you a burst of energy. This is called the fight-or-flight stress response. If the stress is over quickly, your body goes back to normal and no harm is done.  But if stress happens too often or lasts too long, it can have bad effects. Long-term stress can make you more likely to get sick, and it can make symptoms of some diseases worse. If you tense up when you are stressed, you may develop neck, shoulder, or low back pain. Stress is linked to high blood pressure and heart disease.  Stress also harms your emotional health. It can make you dowd, tense, or depressed. Your relationships may suffer, and you may not do well at work or school.  What can you do to manage stress?  You can try these things to help manage stress:   Do something active. Exercise or activity can help reduce stress. Walking is a great way to get started. Even everyday activities such as housecleaning or yard work can help.  Try yoga or asad chi. These techniques combine exercise and meditation. You may need some training at first to learn them.  Do something you enjoy. For example, listen to music or go to a movie. Practice your hobby or do volunteer  "work.  Meditate. This can help you relax, because you are not worrying about what happened before or what may happen in the future.  Do guided imagery. Imagine yourself in any setting that helps you feel calm. You can use online videos, books, or a teacher to guide you.  Do breathing exercises. For example:  From a standing position, bend forward from the waist with your knees slightly bent. Let your arms dangle close to the floor.  Breathe in slowly and deeply as you return to a standing position. Roll up slowly and lift your head last.  Hold your breath for just a few seconds in the standing position.  Breathe out slowly and bend forward from the waist.  Let your feelings out. Talk, laugh, cry, and express anger when you need to. Talking with supportive friends or family, a counselor, or a juve leader about your feelings is a healthy way to relieve stress. Avoid discussing your feelings with people who make you feel worse.  Write. It may help to write about things that are bothering you. This helps you find out how much stress you feel and what is causing it. When you know this, you can find better ways to cope.  What can you do to prevent stress?  You might try some of these things to help prevent stress:  Manage your time. This helps you find time to do the things you want and need to do.  Get enough sleep. Your body recovers from the stresses of the day while you are sleeping.  Get support. Your family, friends, and community can make a difference in how you experience stress.  Limit your news feed. Avoid or limit time on social media or news that may make you feel stressed.  Do something active. Exercise or activity can help reduce stress. Walking is a great way to get started.  Where can you learn more?  Go to https://www.[employer].net/patiented  Enter N032 in the search box to learn more about \"Learning About Stress.\"  Current as of: October 24, 2024  Content Version: 14.5 2024-2025 [employer]   Care instructions adapted under license by your healthcare professional. If you have questions about a medical condition or this instruction, always ask your healthcare professional. [employer] disclaims any warranty or liability for your use of this information.

## 2025-06-27 ENCOUNTER — RESULTS FOLLOW-UP (OUTPATIENT)
Dept: OBGYN | Facility: CLINIC | Age: 60
End: 2025-06-27

## 2025-06-27 DIAGNOSIS — Z13.1 SCREENING FOR DIABETES MELLITUS: Primary | ICD-10-CM

## 2025-07-01 LAB
BKR AP ASSOCIATED HPV REPORT: NORMAL
BKR LAB AP GYN ADEQUACY: NORMAL
BKR LAB AP GYN INTERPRETATION: NORMAL
BKR LAB AP PREVIOUS ABNORMAL: NORMAL
EST. AVERAGE GLUCOSE BLD GHB EST-MCNC: 126 MG/DL
HBA1C MFR BLD: 6 % (ref 0–5.6)
PATH REPORT.COMMENTS IMP SPEC: NORMAL
PATH REPORT.COMMENTS IMP SPEC: NORMAL
PATH REPORT.RELEVANT HX SPEC: NORMAL